# Patient Record
Sex: FEMALE | Race: WHITE | Employment: UNEMPLOYED | URBAN - METROPOLITAN AREA
[De-identification: names, ages, dates, MRNs, and addresses within clinical notes are randomized per-mention and may not be internally consistent; named-entity substitution may affect disease eponyms.]

---

## 2017-12-18 ENCOUNTER — OFFICE VISIT (OUTPATIENT)
Dept: INTERNAL MEDICINE CLINIC | Facility: CLINIC | Age: 22
End: 2017-12-18

## 2017-12-18 VITALS
HEIGHT: 65 IN | SYSTOLIC BLOOD PRESSURE: 100 MMHG | TEMPERATURE: 99 F | WEIGHT: 227.81 LBS | DIASTOLIC BLOOD PRESSURE: 60 MMHG | HEART RATE: 84 BPM | BODY MASS INDEX: 37.95 KG/M2

## 2017-12-18 DIAGNOSIS — Z00.00 LABORATORY EXAMINATION ORDERED AS PART OF A COMPLETE PHYSICAL EXAMINATION: ICD-10-CM

## 2017-12-18 DIAGNOSIS — F84.5 ASPERGER'S DISORDER: ICD-10-CM

## 2017-12-18 DIAGNOSIS — F34.1 DYSTHYMIC DISORDER: ICD-10-CM

## 2017-12-18 DIAGNOSIS — Z00.00 ROUTINE GENERAL MEDICAL EXAMINATION AT A HEALTH CARE FACILITY: Primary | ICD-10-CM

## 2017-12-18 DIAGNOSIS — F41.1 GENERALIZED ANXIETY DISORDER: ICD-10-CM

## 2017-12-18 DIAGNOSIS — Z12.4 SCREENING FOR MALIGNANT NEOPLASM OF CERVIX: ICD-10-CM

## 2017-12-18 PROCEDURE — 88175 CYTOPATH C/V AUTO FLUID REDO: CPT | Performed by: NURSE PRACTITIONER

## 2017-12-18 PROCEDURE — 99385 PREV VISIT NEW AGE 18-39: CPT | Performed by: NURSE PRACTITIONER

## 2017-12-18 NOTE — PROGRESS NOTES
Jose Hines is a 25year old female who presents for a complete physical exam:     ISU. English     Patient complains of:  History of pneumonia. Anxiety/depression with OCD. On Prozac.   She is seeing the [place] psychiatrist.   She is fee pain or ST  LUNGS: denies shortness of breath with exertion  CARDIOVASCULAR: denies chest pain on exertion  GI: denies abdominal pain,denies heartburn  : denies dysuria, vaginal discharge or itching  MUSCULOSKELETAL: denies back pain  NEURO: denies heada Follow-up on file. There are no Patient Instructions on file for this visit.

## 2017-12-20 ENCOUNTER — LAB ENCOUNTER (OUTPATIENT)
Dept: LAB | Age: 22
End: 2017-12-20
Attending: NURSE PRACTITIONER
Payer: COMMERCIAL

## 2017-12-20 DIAGNOSIS — Z00.00 LABORATORY EXAMINATION ORDERED AS PART OF A COMPLETE PHYSICAL EXAMINATION: ICD-10-CM

## 2017-12-20 DIAGNOSIS — F41.1 GENERALIZED ANXIETY DISORDER: ICD-10-CM

## 2017-12-20 PROCEDURE — 80053 COMPREHEN METABOLIC PANEL: CPT

## 2017-12-20 PROCEDURE — 84443 ASSAY THYROID STIM HORMONE: CPT

## 2017-12-20 PROCEDURE — 80061 LIPID PANEL: CPT

## 2017-12-20 PROCEDURE — 82306 VITAMIN D 25 HYDROXY: CPT

## 2017-12-20 PROCEDURE — 85025 COMPLETE CBC W/AUTO DIFF WBC: CPT

## 2018-07-16 NOTE — TELEPHONE ENCOUNTER
Mom states Marybeth Montgomery has been seeing the school psychologist, but is a senior and wanting to establish care with someone local. She is wanting 2 names of someone SD would recommend?   She says Tiffanie Allen knows Marybeth Montgomery, so Mom would like someone that SD would [facility]

## 2018-07-16 NOTE — TELEPHONE ENCOUNTER
I met her only one time. My documentation states seeing psychiatrist at school   Mom's message states psychologist.  Please clarfy what she needs.

## 2018-07-17 NOTE — TELEPHONE ENCOUNTER
Called pt's mother, Renetta Mora back. LMTCB. Per Renetta Adamsy in previous telephone conversation, she verbally allowed me to leave a detailed message with information. Asked for a return call after 8 am tomorrow to further discuss.

## 2018-07-17 NOTE — TELEPHONE ENCOUNTER
I do not know any of the psychiatrists personally   I will enter referral to [address] [place] [address] for assistance. They will call her to discuss her needs. Ok to fill prozac if needed until she can get in to see someone.

## 2018-07-17 NOTE — TELEPHONE ENCOUNTER
Pt's mother Amy Nolan called back ([id number] [address] Alberts per HIPAA). Clarified what was needed for pt. Pt was in the room while mother was on speaker phone.   Amy Nolan stated pt has been seeing a psychiatrist since age 16, and has been seeing someone at her college ([address]

## 2018-07-18 NOTE — TELEPHONE ENCOUNTER
Spoke with Aime Johnson patient's mother-Ok per HIPAA Pt was in the room while mother was on speaker phone-informed SD does not know of any psychiatrists personally, referral entered for [facility] navigator for assistance.  Patient and Mother aware [facility] will call her to d

## 2019-01-22 ENCOUNTER — OFFICE VISIT (OUTPATIENT)
Dept: INTERNAL MEDICINE CLINIC | Facility: CLINIC | Age: 24
End: 2019-01-22
Payer: COMMERCIAL

## 2019-01-22 VITALS
HEART RATE: 60 BPM | WEIGHT: 239.19 LBS | BODY MASS INDEX: 39.85 KG/M2 | TEMPERATURE: 98 F | DIASTOLIC BLOOD PRESSURE: 66 MMHG | RESPIRATION RATE: 16 BRPM | HEIGHT: 65 IN | SYSTOLIC BLOOD PRESSURE: 106 MMHG

## 2019-01-22 DIAGNOSIS — F84.5 ASPERGER'S DISORDER: ICD-10-CM

## 2019-01-22 DIAGNOSIS — Z00.00 ROUTINE GENERAL MEDICAL EXAMINATION AT A HEALTH CARE FACILITY: Primary | ICD-10-CM

## 2019-01-22 DIAGNOSIS — F34.1 DYSTHYMIC DISORDER: ICD-10-CM

## 2019-01-22 DIAGNOSIS — F41.1 GENERALIZED ANXIETY DISORDER: ICD-10-CM

## 2019-01-22 PROCEDURE — 90686 IIV4 VACC NO PRSV 0.5 ML IM: CPT | Performed by: NURSE PRACTITIONER

## 2019-01-22 PROCEDURE — 90471 IMMUNIZATION ADMIN: CPT | Performed by: NURSE PRACTITIONER

## 2019-01-22 PROCEDURE — 99395 PREV VISIT EST AGE 18-39: CPT | Performed by: NURSE PRACTITIONER

## 2019-01-22 RX ORDER — CLOMIPRAMINE HYDROCHLORIDE 25 MG/1
CAPSULE ORAL
COMMUNITY
End: 2020-01-27

## 2019-01-22 RX ORDER — MULTIVIT-MIN/IRON/FOLIC ACID/K 18-600-40
CAPSULE ORAL
COMMUNITY

## 2019-01-22 NOTE — PROGRESS NOTES
Olivier Lopes is a 26year old female who presents for a complete physical exam:       Patient complains of:     Anxiety/dysthymic disorder. Follows with psych. Clomipramine 25mg   [place]. Wants to change her meds.    Not feelin dysplasia:  Yes  Menstrual Cycle: regular         LMP: 12/25  Symptoms:   Sexually Active:  No   Breast Cancer Screening: There are no preventive care reminders to display for this patient.        REVIEW OF SYSTEMS:   GENERAL: feels well otherwise  SKIN: d Refills for this Visit:  Requested Prescriptions      No prescriptions requested or ordered in this encounter       Imaging & Consults:  FLULAVAL INFLUENZA VACCINE QUAD PRESERVATIVE FREE 0.5 ML    No Follow-up on file.   There are no Patient Instructions on

## 2019-03-28 ENCOUNTER — HOSPITAL ENCOUNTER (EMERGENCY)
Facility: HOSPITAL | Age: 24
Discharge: HOME | End: 2019-03-28
Attending: EMERGENCY MEDICINE
Payer: COMMERCIAL

## 2019-03-28 VITALS
HEIGHT: 64 IN | WEIGHT: 220 LBS | BODY MASS INDEX: 37.56 KG/M2 | DIASTOLIC BLOOD PRESSURE: 69 MMHG | TEMPERATURE: 98 F | HEART RATE: 76 BPM | OXYGEN SATURATION: 98 % | RESPIRATION RATE: 16 BRPM | SYSTOLIC BLOOD PRESSURE: 111 MMHG

## 2019-03-28 DIAGNOSIS — T43.591A: Primary | ICD-10-CM

## 2019-03-28 PROCEDURE — 80048 BASIC METABOLIC PNL TOTAL CA: CPT | Performed by: EMERGENCY MEDICINE

## 2019-03-28 PROCEDURE — 81001 URINALYSIS AUTO W/SCOPE: CPT | Performed by: EMERGENCY MEDICINE

## 2019-03-28 PROCEDURE — 87086 URINE CULTURE/COLONY COUNT: CPT | Performed by: EMERGENCY MEDICINE

## 2019-03-28 PROCEDURE — 99284 EMERGENCY DEPT VISIT MOD MDM: CPT

## 2019-03-28 PROCEDURE — 80307 DRUG TEST PRSMV CHEM ANLYZR: CPT | Performed by: EMERGENCY MEDICINE

## 2019-03-28 PROCEDURE — 81025 URINE PREGNANCY TEST: CPT | Performed by: EMERGENCY MEDICINE

## 2019-03-28 PROCEDURE — 80329 ANALGESICS NON-OPIOID 1 OR 2: CPT | Performed by: EMERGENCY MEDICINE

## 2019-03-28 PROCEDURE — 93010 ELECTROCARDIOGRAM REPORT: CPT

## 2019-03-28 PROCEDURE — 80320 DRUG SCREEN QUANTALCOHOLS: CPT | Performed by: EMERGENCY MEDICINE

## 2019-03-28 PROCEDURE — 36415 COLL VENOUS BLD VENIPUNCTURE: CPT

## 2019-03-28 NOTE — ED INITIAL ASSESSMENT (HPI)
Pt sent from [facility] for medical clearance. Pt states she took 6-7 atarax over the course of a 10 hour period yesterday. Pt states she did not intend to harm herself, she just \"wanted to sleep because people kept bothering me\".  AYLIN stated pt was offered outp

## 2019-12-17 ENCOUNTER — TELEPHONE (OUTPATIENT)
Dept: INTERNAL MEDICINE CLINIC | Facility: CLINIC | Age: 24
End: 2019-12-17

## 2019-12-17 DIAGNOSIS — Z13.220 SCREENING FOR LIPID DISORDERS: ICD-10-CM

## 2019-12-17 DIAGNOSIS — Z00.00 ROUTINE GENERAL MEDICAL EXAMINATION AT A HEALTH CARE FACILITY: Primary | ICD-10-CM

## 2019-12-17 DIAGNOSIS — Z13.228 SCREENING FOR METABOLIC DISORDER: ICD-10-CM

## 2019-12-17 DIAGNOSIS — Z13.0 SCREENING FOR BLOOD DISEASE: ICD-10-CM

## 2019-12-17 DIAGNOSIS — Z13.29 SCREENING FOR THYROID DISORDER: ICD-10-CM

## 2019-12-17 NOTE — TELEPHONE ENCOUNTER
Future Appointments   Date Time Provider Lukasz Pedersen   1/27/2020 11:00 AM CRIS Coughlin [facility]     Pt would like fasting BW orders sent to [facility].

## 2020-01-25 ENCOUNTER — LAB ENCOUNTER (OUTPATIENT)
Dept: LAB | Age: 25
End: 2020-01-25
Attending: NURSE PRACTITIONER
Payer: COMMERCIAL

## 2020-01-25 DIAGNOSIS — Z13.29 SCREENING FOR THYROID DISORDER: ICD-10-CM

## 2020-01-25 DIAGNOSIS — Z00.00 ROUTINE GENERAL MEDICAL EXAMINATION AT A HEALTH CARE FACILITY: ICD-10-CM

## 2020-01-25 DIAGNOSIS — Z13.220 SCREENING FOR LIPID DISORDERS: ICD-10-CM

## 2020-01-25 DIAGNOSIS — Z13.0 SCREENING FOR BLOOD DISEASE: ICD-10-CM

## 2020-01-25 DIAGNOSIS — Z13.228 SCREENING FOR METABOLIC DISORDER: ICD-10-CM

## 2020-01-25 LAB
ALBUMIN SERPL-MCNC: 3.5 G/DL (ref 3.4–5)
ALBUMIN/GLOB SERPL: 1 {RATIO} (ref 1–2)
ALP LIVER SERPL-CCNC: 84 U/L (ref 52–144)
ALT SERPL-CCNC: 29 U/L (ref 13–56)
ANION GAP SERPL CALC-SCNC: 4 MMOL/L (ref 0–18)
AST SERPL-CCNC: 18 U/L (ref 15–37)
BASOPHILS # BLD AUTO: 0.04 X10(3) UL (ref 0–0.2)
BASOPHILS NFR BLD AUTO: 0.6 %
BILIRUB SERPL-MCNC: 0.5 MG/DL (ref 0.1–2)
BUN BLD-MCNC: 7 MG/DL (ref 7–18)
BUN/CREAT SERPL: 8.2 (ref 10–20)
CALCIUM BLD-MCNC: 8.6 MG/DL (ref 8.5–10.1)
CHLORIDE SERPL-SCNC: 109 MMOL/L (ref 98–112)
CHOLEST SMN-MCNC: 132 MG/DL (ref ?–200)
CO2 SERPL-SCNC: 26 MMOL/L (ref 21–32)
CREAT BLD-MCNC: 0.85 MG/DL (ref 0.55–1.02)
DEPRECATED RDW RBC AUTO: 40.6 FL (ref 35.1–46.3)
EOSINOPHIL # BLD AUTO: 0.14 X10(3) UL (ref 0–0.7)
EOSINOPHIL NFR BLD AUTO: 2 %
ERYTHROCYTE [DISTWIDTH] IN BLOOD BY AUTOMATED COUNT: 12.2 % (ref 11–15)
GLOBULIN PLAS-MCNC: 3.6 G/DL (ref 2.8–4.4)
GLUCOSE BLD-MCNC: 88 MG/DL (ref 70–99)
HCT VFR BLD AUTO: 38.9 % (ref 35–48)
HDLC SERPL-MCNC: 33 MG/DL (ref 40–59)
HGB BLD-MCNC: 12.9 G/DL (ref 12–16)
IMM GRANULOCYTES # BLD AUTO: 0.02 X10(3) UL (ref 0–1)
IMM GRANULOCYTES NFR BLD: 0.3 %
LDLC SERPL CALC-MCNC: 80 MG/DL (ref ?–100)
LYMPHOCYTES # BLD AUTO: 2.91 X10(3) UL (ref 1–4)
LYMPHOCYTES NFR BLD AUTO: 41.3 %
M PROTEIN MFR SERPL ELPH: 7.1 G/DL (ref 6.4–8.2)
MCH RBC QN AUTO: 30.3 PG (ref 26–34)
MCHC RBC AUTO-ENTMCNC: 33.2 G/DL (ref 31–37)
MCV RBC AUTO: 91.3 FL (ref 80–100)
MONOCYTES # BLD AUTO: 0.5 X10(3) UL (ref 0.1–1)
MONOCYTES NFR BLD AUTO: 7.1 %
NEUTROPHILS # BLD AUTO: 3.43 X10 (3) UL (ref 1.5–7.7)
NEUTROPHILS # BLD AUTO: 3.43 X10(3) UL (ref 1.5–7.7)
NEUTROPHILS NFR BLD AUTO: 48.7 %
NONHDLC SERPL-MCNC: 99 MG/DL (ref ?–130)
OSMOLALITY SERPL CALC.SUM OF ELEC: 285 MOSM/KG (ref 275–295)
PATIENT FASTING Y/N/NP: YES
PATIENT FASTING Y/N/NP: YES
PLATELET # BLD AUTO: 342 10(3)UL (ref 150–450)
POTASSIUM SERPL-SCNC: 3.7 MMOL/L (ref 3.5–5.1)
RBC # BLD AUTO: 4.26 X10(6)UL (ref 3.8–5.3)
SODIUM SERPL-SCNC: 139 MMOL/L (ref 136–145)
TRIGL SERPL-MCNC: 96 MG/DL (ref 30–149)
TSI SER-ACNC: 1.39 MIU/ML (ref 0.36–3.74)
VLDLC SERPL CALC-MCNC: 19 MG/DL (ref 0–30)
WBC # BLD AUTO: 7 X10(3) UL (ref 4–11)

## 2020-01-25 PROCEDURE — 80061 LIPID PANEL: CPT | Performed by: NURSE PRACTITIONER

## 2020-01-25 PROCEDURE — 80050 GENERAL HEALTH PANEL: CPT | Performed by: NURSE PRACTITIONER

## 2020-01-27 ENCOUNTER — OFFICE VISIT (OUTPATIENT)
Dept: INTERNAL MEDICINE CLINIC | Facility: CLINIC | Age: 25
End: 2020-01-27
Payer: COMMERCIAL

## 2020-01-27 VITALS
OXYGEN SATURATION: 97 % | WEIGHT: 243 LBS | HEIGHT: 65.35 IN | BODY MASS INDEX: 40 KG/M2 | TEMPERATURE: 98 F | HEART RATE: 76 BPM | DIASTOLIC BLOOD PRESSURE: 66 MMHG | SYSTOLIC BLOOD PRESSURE: 110 MMHG

## 2020-01-27 DIAGNOSIS — Z00.00 ROUTINE GENERAL MEDICAL EXAMINATION AT A HEALTH CARE FACILITY: Primary | ICD-10-CM

## 2020-01-27 DIAGNOSIS — F41.9 ANXIETY AND DEPRESSION: ICD-10-CM

## 2020-01-27 DIAGNOSIS — F41.1 GENERALIZED ANXIETY DISORDER: ICD-10-CM

## 2020-01-27 DIAGNOSIS — F34.1 DYSTHYMIC DISORDER: ICD-10-CM

## 2020-01-27 DIAGNOSIS — F32.A ANXIETY AND DEPRESSION: ICD-10-CM

## 2020-01-27 DIAGNOSIS — F84.5 ASPERGER'S DISORDER: ICD-10-CM

## 2020-01-27 PROCEDURE — 99395 PREV VISIT EST AGE 18-39: CPT | Performed by: NURSE PRACTITIONER

## 2020-01-27 RX ORDER — DESVENLAFAXINE 50 MG/1
1 TABLET, EXTENDED RELEASE ORAL DAILY
COMMUNITY
End: 2020-07-30 | Stop reason: ALTCHOICE

## 2020-01-27 NOTE — PROGRESS NOTES
Stanford Oropeza is a 22year old female who presents for a complete physical exam:       Patient complains of:    Depression. [address] She is between psychiatrists and is frustrated veronique her current psych moved.    Pristiq    [employer] preventive care reminders to display for this patient.    Gyne:   Pap Smear,3 Years due on 12/18/2020           History of dysplasia:  No  Menstrual Cycle: regular          LMP: 1/20/2020  Symptoms: periods are regular  Sexually Active:  No   Breast Cancer like to discuss further if she has not been fully vaccinated. Routine general medical examination at a health care facility  (primary encounter diagnosis)  Anxiety and depression   Pristiq   Per psych. She has upcoming appt at [facility].   She will sandee

## 2020-02-20 ENCOUNTER — OFFICE VISIT (OUTPATIENT)
Dept: FAMILY MEDICINE CLINIC | Facility: CLINIC | Age: 25
End: 2020-02-20
Payer: COMMERCIAL

## 2020-02-20 VITALS
DIASTOLIC BLOOD PRESSURE: 60 MMHG | TEMPERATURE: 100 F | WEIGHT: 247.63 LBS | HEIGHT: 65 IN | RESPIRATION RATE: 16 BRPM | HEART RATE: 84 BPM | SYSTOLIC BLOOD PRESSURE: 100 MMHG | BODY MASS INDEX: 41.26 KG/M2 | OXYGEN SATURATION: 99 %

## 2020-02-20 DIAGNOSIS — R68.89 FLU-LIKE SYMPTOMS: ICD-10-CM

## 2020-02-20 DIAGNOSIS — J02.9 SORE THROAT: Primary | ICD-10-CM

## 2020-02-20 LAB
CONTROL LINE PRESENT WITH A CLEAR BACKGROUND (YES/NO): YES YES/NO
KIT LOT #: NORMAL NUMERIC
POCT INFLUENZA A: NEGATIVE
POCT INFLUENZA B: NEGATIVE

## 2020-02-20 PROCEDURE — 99213 OFFICE O/P EST LOW 20 MIN: CPT | Performed by: NURSE PRACTITIONER

## 2020-02-20 PROCEDURE — 87880 STREP A ASSAY W/OPTIC: CPT | Performed by: NURSE PRACTITIONER

## 2020-02-20 PROCEDURE — 87502 INFLUENZA DNA AMP PROBE: CPT | Performed by: NURSE PRACTITIONER

## 2020-02-20 NOTE — PROGRESS NOTES
Patient presents with:  Sore Throat: sore throat, nasal congestion, bl ear/pressure, cough, pnd, x last night   :    HPI:   Nestor Reynaga is a 22year old female who presents for upper respiratory symptoms for  2  days. Started suddenly.   Symptoms hav NOSE: nostrils patent, clear nasal mucous, nasal mucosa reddened and swollen  THROAT: oral mucosa pink, moist. No visible dental caries. Posterior pharynx is mild erythematous. no exudates.   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 The flu is caused by a virus.  The virus spreads through the air in droplets when · Call your provider if your fever is 100.4°F (38°C) or higher, or you become dizzy, lightheaded, or short of breath. Taking steps to protect others  · Wash your hands often, especially after coughing or sneezing.  Or clean your hands with an alcohol-based Handwashing is one of the best ways to prevent many common infections. If you are caring for or visiting someone with the flu, wash your hands each time you enter and leave the room. Follow these steps:  · Use warm water and plenty of soap.  Rub your hands

## 2020-02-23 ENCOUNTER — OFFICE VISIT (OUTPATIENT)
Dept: FAMILY MEDICINE CLINIC | Facility: CLINIC | Age: 25
End: 2020-02-23
Payer: COMMERCIAL

## 2020-02-23 VITALS
SYSTOLIC BLOOD PRESSURE: 102 MMHG | DIASTOLIC BLOOD PRESSURE: 78 MMHG | OXYGEN SATURATION: 99 % | RESPIRATION RATE: 20 BRPM | HEART RATE: 109 BPM | TEMPERATURE: 99 F

## 2020-02-23 DIAGNOSIS — J06.9 VIRAL UPPER RESPIRATORY ILLNESS: Primary | ICD-10-CM

## 2020-02-23 PROCEDURE — 99213 OFFICE O/P EST LOW 20 MIN: CPT | Performed by: NURSE PRACTITIONER

## 2020-02-23 NOTE — PROGRESS NOTES
Patient presents with:  Flu  :    HPI:   Gerald Ovalles is a 22year old female who presents for upper respiratory symptoms for  4  days. Started suddenly. Symptoms have been better since onset, still with cough.  This provider saw this patient 3 days conjunctiva clear, EOM intact  EARS: TM's clear gray, no bulging, no retraction, + fluid, bony landmarks intact  NOSE: nostrils patent, clear nasal mucous, nasal mucosa reddened and swollen  THROAT: oral mucosa pink, moist. No visible dental caries.  Poster

## 2020-02-27 PROBLEM — F42.9 OBSESSIVE COMPULSIVE DISORDER: Status: ACTIVE | Noted: 2020-02-27

## 2020-02-27 PROBLEM — F33.2 SEVERE RECURRENT MAJOR DEPRESSION WITHOUT PSYCHOTIC FEATURES (HCC): Status: ACTIVE | Noted: 2020-02-27

## 2020-02-27 RX ORDER — DESVENLAFAXINE 25 MG/1
TABLET, EXTENDED RELEASE ORAL
COMMUNITY
Start: 2020-02-25 | End: 2020-07-30 | Stop reason: ALTCHOICE

## 2020-03-19 ENCOUNTER — E-VISIT (OUTPATIENT)
Dept: FAMILY MEDICINE CLINIC | Facility: CLINIC | Age: 25
End: 2020-03-19

## 2020-03-19 DIAGNOSIS — J30.2 SEASONAL ALLERGIES: Primary | ICD-10-CM

## 2020-07-27 ENCOUNTER — E-VISIT (OUTPATIENT)
Dept: FAMILY MEDICINE CLINIC | Facility: CLINIC | Age: 25
End: 2020-07-27

## 2020-07-27 DIAGNOSIS — Z02.9 ADMINISTRATIVE ENCOUNTER: Primary | ICD-10-CM

## 2020-07-28 NOTE — PROGRESS NOTES
Patient was directed to contact her PCP regarding ongoing health concerns. E-visit cancelled with No Charge.

## 2020-07-30 ENCOUNTER — OFFICE VISIT (OUTPATIENT)
Dept: INTERNAL MEDICINE CLINIC | Facility: CLINIC | Age: 25
End: 2020-07-30
Payer: COMMERCIAL

## 2020-07-30 ENCOUNTER — LAB ENCOUNTER (OUTPATIENT)
Dept: LAB | Facility: HOSPITAL | Age: 25
End: 2020-07-30
Attending: NURSE PRACTITIONER
Payer: COMMERCIAL

## 2020-07-30 VITALS
BODY MASS INDEX: 43 KG/M2 | TEMPERATURE: 98 F | DIASTOLIC BLOOD PRESSURE: 64 MMHG | SYSTOLIC BLOOD PRESSURE: 94 MMHG | WEIGHT: 259 LBS | HEART RATE: 96 BPM | RESPIRATION RATE: 16 BRPM

## 2020-07-30 DIAGNOSIS — R53.83 FATIGUE, UNSPECIFIED TYPE: ICD-10-CM

## 2020-07-30 DIAGNOSIS — F32.A ANXIETY AND DEPRESSION: ICD-10-CM

## 2020-07-30 DIAGNOSIS — J02.9 SORE THROAT: ICD-10-CM

## 2020-07-30 DIAGNOSIS — R51.9 PERSISTENT HEADACHES: ICD-10-CM

## 2020-07-30 DIAGNOSIS — F41.9 ANXIETY AND DEPRESSION: ICD-10-CM

## 2020-07-30 DIAGNOSIS — J02.9 SORE THROAT: Primary | ICD-10-CM

## 2020-07-30 PROCEDURE — 99214 OFFICE O/P EST MOD 30 MIN: CPT | Performed by: NURSE PRACTITIONER

## 2020-07-30 PROCEDURE — 3074F SYST BP LT 130 MM HG: CPT | Performed by: NURSE PRACTITIONER

## 2020-07-30 PROCEDURE — 3078F DIAST BP <80 MM HG: CPT | Performed by: NURSE PRACTITIONER

## 2020-07-30 RX ORDER — DESVENLAFAXINE 100 MG/1
100 TABLET, EXTENDED RELEASE ORAL DAILY
COMMUNITY
Start: 2020-07-21

## 2020-07-30 RX ORDER — FLUTICASONE PROPIONATE 50 MCG
1 SPRAY, SUSPENSION (ML) NASAL 2 TIMES DAILY
Qty: 1 BOTTLE | Refills: 3 | Status: SHIPPED | OUTPATIENT
Start: 2020-07-30 | End: 2020-11-23

## 2020-07-30 RX ORDER — AZITHROMYCIN 250 MG/1
TABLET, FILM COATED ORAL
Qty: 6 TABLET | Refills: 0 | Status: SHIPPED | OUTPATIENT
Start: 2020-07-30 | End: 2020-08-04

## 2020-07-30 NOTE — PROGRESS NOTES
Yayo Garcia is a 22year old female.   Patient presents with:  Migraine: SN Rm 11; H/O migraines, more consistant, \"feels sluggish\"  Sore Throat: x 1 wk, h/o allergies, some nasal drip      HPI:   Presents for eval of sore throat and eval of migrai Smoking status: Never Smoker      Smokeless tobacco: Never Used    Alcohol use: No    Drug use: No    No family history on file.      Allergies    Latex                   ITCHING  Pollen                  ITCHING    REVIEW OF SYSTEMS:   GENERAL HEALTH: not file for this visit.

## 2020-08-01 LAB — SARS-COV-2 BY PCR: NOT DETECTED

## 2020-08-03 ENCOUNTER — TELEPHONE (OUTPATIENT)
Dept: INTERNAL MEDICINE CLINIC | Facility: CLINIC | Age: 25
End: 2020-08-03

## 2020-08-03 NOTE — TELEPHONE ENCOUNTER
Based on Pt's negative results of her COVID test, Pt needs a \"return to [employer]" note    The note needs to say that she missed work for not feeling good for 2 days     Pt is fever free for 2 days now.,

## 2020-08-03 NOTE — TELEPHONE ENCOUNTER
LOV with SD 7/30/2020; SD please advise regarding work note for patient due to missed days. Patients covid testing was negative.

## 2020-09-30 ENCOUNTER — TELEMEDICINE (OUTPATIENT)
Dept: INTERNAL MEDICINE CLINIC | Facility: CLINIC | Age: 25
End: 2020-09-30

## 2020-09-30 ENCOUNTER — APPOINTMENT (OUTPATIENT)
Dept: LAB | Facility: HOSPITAL | Age: 25
End: 2020-09-30
Attending: NURSE PRACTITIONER
Payer: COMMERCIAL

## 2020-09-30 DIAGNOSIS — R52 BODY ACHES: ICD-10-CM

## 2020-09-30 DIAGNOSIS — R50.9 FEVER, UNSPECIFIED FEVER CAUSE: Primary | ICD-10-CM

## 2020-09-30 DIAGNOSIS — J02.9 SORE THROAT: ICD-10-CM

## 2020-09-30 DIAGNOSIS — R50.9 FEVER, UNSPECIFIED FEVER CAUSE: ICD-10-CM

## 2020-09-30 PROCEDURE — 99213 OFFICE O/P EST LOW 20 MIN: CPT | Performed by: NURSE PRACTITIONER

## 2020-09-30 NOTE — PROGRESS NOTES
Due to COVID-19 ACTION PLAN, the patient's office visit was converted to a video visit. This visit is conducted using video and audio. The patient consents to this service.   The patient understands and accepts financial responsibility for any deductible, exam as this is a video visit. Appropriate affect, alert and oriented x 3. No distress. No conversational dyspnea. Speaking in full sentences.         ASSESSMENT AND PLAN:     Fever, unspecified fever cause  (primary encounter diagnosis)  Sore throat  C

## 2020-10-06 ENCOUNTER — PATIENT MESSAGE (OUTPATIENT)
Dept: INTERNAL MEDICINE CLINIC | Facility: CLINIC | Age: 25
End: 2020-10-06

## 2020-10-07 NOTE — TELEPHONE ENCOUNTER
From: Alis Dyson  To:  CRIS Evans  Sent: 10/6/2020 6:59 PM CDT  Subject: Other    I wasn't sure where to send this, but may I please a doctor note for to 9/28-10/07 as the first few days I could not go in due to a fever and after an evisit t

## 2020-10-07 NOTE — TELEPHONE ENCOUNTER
Called and LVM for patient to inform her letter is done and ready to . Since patient did not answer I will mail out a copy and hold one at my desk in yellow folder if she prefers to .

## 2020-10-07 NOTE — TELEPHONE ENCOUNTER
Pt stated the letter needs to be retyped. She was out of work from 9/28/20 to 10/2/20 and returning to work on 10/5/20. No need to print no need to fax or mail she will print it herself via [employer].

## 2020-11-19 ENCOUNTER — TELEPHONE (OUTPATIENT)
Dept: INTERNAL MEDICINE CLINIC | Facility: CLINIC | Age: 25
End: 2020-11-19

## 2020-11-19 DIAGNOSIS — Z00.00 ROUTINE GENERAL MEDICAL EXAMINATION AT A HEALTH CARE FACILITY: Primary | ICD-10-CM

## 2020-11-19 DIAGNOSIS — Z13.220 SCREENING FOR LIPID DISORDERS: ICD-10-CM

## 2020-11-19 DIAGNOSIS — Z13.228 SCREENING FOR METABOLIC DISORDER: ICD-10-CM

## 2020-11-19 DIAGNOSIS — Z13.0 SCREENING FOR BLOOD DISEASE: ICD-10-CM

## 2020-11-19 DIAGNOSIS — Z13.29 SCREENING FOR THYROID DISORDER: ICD-10-CM

## 2020-11-19 NOTE — TELEPHONE ENCOUNTER
Future Appointments   Date Time Provider Lukasz Nuvia   12/2/2020  3:40 PM Jocelyn Weller, APRN [facility]     Orders to edward- Pt informed that labs need to be completed no sooner than 2 weeks prior to the appt.  Pt aware to fast-no call frankie

## 2020-11-23 ENCOUNTER — OFFICE VISIT (OUTPATIENT)
Dept: INTERNAL MEDICINE CLINIC | Facility: CLINIC | Age: 25
End: 2020-11-23
Payer: COMMERCIAL

## 2020-11-23 VITALS
BODY MASS INDEX: 44.2 KG/M2 | DIASTOLIC BLOOD PRESSURE: 66 MMHG | WEIGHT: 275 LBS | HEART RATE: 84 BPM | TEMPERATURE: 97 F | HEIGHT: 66 IN | SYSTOLIC BLOOD PRESSURE: 116 MMHG

## 2020-11-23 DIAGNOSIS — F33.2 SEVERE RECURRENT MAJOR DEPRESSION WITHOUT PSYCHOTIC FEATURES (HCC): ICD-10-CM

## 2020-11-23 DIAGNOSIS — Z00.00 ROUTINE GENERAL MEDICAL EXAMINATION AT A HEALTH CARE FACILITY: Primary | ICD-10-CM

## 2020-11-23 DIAGNOSIS — Z11.3 SCREEN FOR STD (SEXUALLY TRANSMITTED DISEASE): ICD-10-CM

## 2020-11-23 DIAGNOSIS — F41.9 ANXIETY AND DEPRESSION: ICD-10-CM

## 2020-11-23 DIAGNOSIS — Z12.4 SCREENING FOR CERVICAL CANCER: ICD-10-CM

## 2020-11-23 DIAGNOSIS — F32.A ANXIETY AND DEPRESSION: ICD-10-CM

## 2020-11-23 PROCEDURE — 87491 CHLMYD TRACH DNA AMP PROBE: CPT | Performed by: NURSE PRACTITIONER

## 2020-11-23 PROCEDURE — 3074F SYST BP LT 130 MM HG: CPT | Performed by: NURSE PRACTITIONER

## 2020-11-23 PROCEDURE — 90651 9VHPV VACCINE 2/3 DOSE IM: CPT | Performed by: NURSE PRACTITIONER

## 2020-11-23 PROCEDURE — 90471 IMMUNIZATION ADMIN: CPT | Performed by: NURSE PRACTITIONER

## 2020-11-23 PROCEDURE — 87591 N.GONORRHOEAE DNA AMP PROB: CPT | Performed by: NURSE PRACTITIONER

## 2020-11-23 PROCEDURE — 3008F BODY MASS INDEX DOCD: CPT | Performed by: NURSE PRACTITIONER

## 2020-11-23 PROCEDURE — 3078F DIAST BP <80 MM HG: CPT | Performed by: NURSE PRACTITIONER

## 2020-11-23 PROCEDURE — 88175 CYTOPATH C/V AUTO FLUID REDO: CPT | Performed by: NURSE PRACTITIONER

## 2020-11-23 PROCEDURE — 99072 ADDL SUPL MATRL&STAF TM PHE: CPT | Performed by: NURSE PRACTITIONER

## 2020-11-23 PROCEDURE — 99395 PREV VISIT EST AGE 18-39: CPT | Performed by: NURSE PRACTITIONER

## 2020-11-23 NOTE — PROGRESS NOTES
Daniel Marrero is a 22year old female who presents for a complete physical exam:       Patient complains of:    anxeity and depression   Trying to adjust her meds with psych for better control of panic attacks.     New job       Osei Dows from Last 6 10/08/2019  09/27/2020    Dental Visits: yes   Colon cancer screening: na There are no preventive care reminders to display for this patient.    Gyne:   Pap Smear,3 Years due on 12/18/2020         History of dysplasia:  No  Menstrual Cycle: regular MAINTENANCE:  Labs done early 2020. Stable   Will continue to follow.  gardasil #1 today.       Routine general medical examination at a health care facility  (primary encounter diagnosis)  Severe recurrent major depression without psychotic features (hcc)

## 2020-11-27 ENCOUNTER — OFFICE VISIT (OUTPATIENT)
Dept: FAMILY MEDICINE CLINIC | Facility: CLINIC | Age: 25
End: 2020-11-27
Payer: COMMERCIAL

## 2020-11-27 VITALS
HEART RATE: 97 BPM | BODY MASS INDEX: 44.98 KG/M2 | DIASTOLIC BLOOD PRESSURE: 78 MMHG | WEIGHT: 270 LBS | OXYGEN SATURATION: 98 % | TEMPERATURE: 99 F | RESPIRATION RATE: 20 BRPM | SYSTOLIC BLOOD PRESSURE: 112 MMHG | HEIGHT: 65 IN

## 2020-11-27 DIAGNOSIS — Z20.822 EXPOSURE TO COVID-19 VIRUS: ICD-10-CM

## 2020-11-27 DIAGNOSIS — J02.9 SORE THROAT: Primary | ICD-10-CM

## 2020-11-27 PROCEDURE — 99072 ADDL SUPL MATRL&STAF TM PHE: CPT | Performed by: NURSE PRACTITIONER

## 2020-11-27 PROCEDURE — 3078F DIAST BP <80 MM HG: CPT | Performed by: NURSE PRACTITIONER

## 2020-11-27 PROCEDURE — 3074F SYST BP LT 130 MM HG: CPT | Performed by: NURSE PRACTITIONER

## 2020-11-27 PROCEDURE — 99213 OFFICE O/P EST LOW 20 MIN: CPT | Performed by: NURSE PRACTITIONER

## 2020-11-27 PROCEDURE — 3008F BODY MASS INDEX DOCD: CPT | Performed by: NURSE PRACTITIONER

## 2020-11-27 NOTE — PROGRESS NOTES
CHIEF COMPLAINT:   Patient presents with:  Testing: covid exposure 3days, sore throat      HPI:   Coleen Dinh is a 22year old female who presents for Covid 19 exposure 3 days ago. Reports sore throat but thinks it could be allergies.  Requesting co NOSE: Nostrils patent, no nasal discharge, nasal mucosa pink and non-inflamed  THROAT: Oral mucosa pink, moist. Posterior pharynx is not erythematous. No exudates. Tonsils 2/4.     NECK: Supple, non-tender  LUNGS: clear to auscultation bilaterally; good air · Stay home. Call your healthcare provider and tell them you have symptoms of COVID-19. Do this before going to any hospital or clinic. Follow your provider's instructions. You may be advised to isolate yourself at home. This is called self-isolation.  You · Stay home and start self-isolation. Don’t leave your home unless you need to get medical care. Don't go to work, school, or public areas. Don't use public transportation or taxis. · Follow all instructions from your healthcare provider.  Call your health There is currently no vaccine or medicine approved to prevent the virus. The FDA has approved an antiviral medicine called remdesivir for people in the hospital. It is for people 12 years and older who weigh more than about 88 pounds (40 kgs).  Remdesivir i If you've had confirmed COVID-19, your healthcare team may ask you to consider donating your plasma. This is called COVID-19 convalescent plasma donation.  Plasma from people fully recovered from COVID-19 may contain antibodies to help fight COVID-19 in peo Your limits are different if you've had COVID-19 in the last 3 months but are fully recovered without symptoms and you have been exposed to someone with COVID-19. If you are symptom-free, you don't need to stay home away from others or be retested.  The CDC When you return to public settings  When you are well enough to go outside your home, consider the CDC's guidance on cloth face masks:     · The CDC advises all people over age 2 to wear cloth face masks in public settings when around people outside of the © 7260-3621 [employer] [address]. All rights reserved. This information is not intended as a substitute for professional medical care. Always follow your healthcare professional's instructions.             The patient indicates understanding of these i

## 2020-11-27 NOTE — PATIENT INSTRUCTIONS
Coronavirus Disease 2019 (COVID-19): Caring for Yourself or Others   If you or a household member have symptoms of COVID-19, follow these guidelines for preventing spread of the virus, and managing symptoms.    If you think you have COVID-19 symptoms  · S · Tell the healthcare staff about recent travel. This includes local travel on public transport. Staff may need to find other people you have been in contact with. · Follow all instructions the healthcare staff give you.     If you have been diagnosed with There is currently no vaccine or medicine approved to prevent the virus. The FDA has approved an antiviral medicine called remdesivir for people in the hospital. It is for people 12 years and older who weigh more than about 88 pounds (40 kgs).  Remdesivir i If you've had confirmed COVID-19, your healthcare team may ask you to consider donating your plasma. This is called COVID-19 convalescent plasma donation.  Plasma from people fully recovered from COVID-19 may contain antibodies to help fight COVID-19 in peo Your limits are different if you've had COVID-19 in the last 3 months but are fully recovered without symptoms and you have been exposed to someone with COVID-19. If you are symptom-free, you don't need to stay home away from others or be retested.  The CDC When you return to public settings  When you are well enough to go outside your home, consider the CDC's guidance on cloth face masks:     · The CDC advises all people over age 2 to wear cloth face masks in public settings when around people outside of the © 3022-4749 [employer] [address]. All rights reserved. This information is not intended as a substitute for professional medical care. Always follow your healthcare professional's instructions.

## 2021-02-01 ENCOUNTER — OFFICE VISIT (OUTPATIENT)
Dept: INTERNAL MEDICINE CLINIC | Facility: CLINIC | Age: 26
End: 2021-02-01
Payer: COMMERCIAL

## 2021-02-01 VITALS
SYSTOLIC BLOOD PRESSURE: 106 MMHG | TEMPERATURE: 99 F | DIASTOLIC BLOOD PRESSURE: 72 MMHG | HEIGHT: 65 IN | BODY MASS INDEX: 46.32 KG/M2 | WEIGHT: 278 LBS | HEART RATE: 76 BPM

## 2021-02-01 DIAGNOSIS — N90.7 LABIAL CYST: Primary | ICD-10-CM

## 2021-02-01 PROCEDURE — 99213 OFFICE O/P EST LOW 20 MIN: CPT | Performed by: NURSE PRACTITIONER

## 2021-02-01 PROCEDURE — 3008F BODY MASS INDEX DOCD: CPT | Performed by: NURSE PRACTITIONER

## 2021-02-01 PROCEDURE — 3078F DIAST BP <80 MM HG: CPT | Performed by: NURSE PRACTITIONER

## 2021-02-01 PROCEDURE — 3074F SYST BP LT 130 MM HG: CPT | Performed by: NURSE PRACTITIONER

## 2021-02-01 RX ORDER — BUSPIRONE HYDROCHLORIDE 5 MG/1
5 TABLET ORAL 2 TIMES DAILY
COMMUNITY
Start: 2021-01-11

## 2021-02-01 NOTE — PROGRESS NOTES
Valentina Snell is a 26year old female. Patient presents with:  Cyst: AJ rm 10 Cyst in vaginal area      HPI:   Presents for eval of cysts on labia. She feels one is a bit bothersome and the other is not. Noted a few days ago. No drainage.   She de Temp 98.8 °F (37.1 °C) (Temporal)   Ht 5' 5\" (1.651 m)   Wt 278 lb (126.1 kg)   LMP 11/06/2020   BMI 46.26 kg/m²   GENERAL: well developed, well nourished,in no apparent distress      Labia examined. Small lesion noted.   No redness or swelling    GI:

## 2021-12-29 ENCOUNTER — TELEPHONE (OUTPATIENT)
Dept: INTERNAL MEDICINE CLINIC | Facility: CLINIC | Age: 26
End: 2021-12-29

## 2021-12-29 DIAGNOSIS — Z00.00 ROUTINE GENERAL MEDICAL EXAMINATION AT A HEALTH CARE FACILITY: Primary | ICD-10-CM

## 2021-12-29 DIAGNOSIS — Z13.228 SCREENING FOR METABOLIC DISORDER: ICD-10-CM

## 2021-12-29 DIAGNOSIS — Z13.220 SCREENING FOR LIPID DISORDERS: ICD-10-CM

## 2021-12-29 DIAGNOSIS — Z13.0 SCREENING FOR DISORDER OF BLOOD AND BLOOD-FORMING ORGANS: ICD-10-CM

## 2021-12-29 DIAGNOSIS — Z13.29 SCREENING FOR THYROID DISORDER: ICD-10-CM

## 2021-12-29 NOTE — TELEPHONE ENCOUNTER
CPE   Future Appointments   Date Time Provider Lukasz Pedersen   12/29/2021 11:20 AM CRIS Paredes [facility]        Orders to  [facility]  aware must fast no call back required

## 2022-02-09 ENCOUNTER — TELEPHONE (OUTPATIENT)
Dept: INTERNAL MEDICINE CLINIC | Facility: CLINIC | Age: 27
End: 2022-02-09

## 2022-02-09 NOTE — TELEPHONE ENCOUNTER
Future Appointments   Date Time Provider Lukasz Pedersen   3/11/2022  1:40 PM Tony Wesley APRN [facility]     Orders to      THE [facility]       aware must fast no call back required

## 2022-02-26 ENCOUNTER — LAB ENCOUNTER (OUTPATIENT)
Dept: LAB | Facility: HOSPITAL | Age: 27
End: 2022-02-26
Attending: NURSE PRACTITIONER
Payer: COMMERCIAL

## 2022-02-26 DIAGNOSIS — Z13.220 SCREENING FOR LIPID DISORDERS: ICD-10-CM

## 2022-02-26 DIAGNOSIS — Z13.29 SCREENING FOR THYROID DISORDER: ICD-10-CM

## 2022-02-26 DIAGNOSIS — Z13.228 SCREENING FOR METABOLIC DISORDER: ICD-10-CM

## 2022-02-26 DIAGNOSIS — Z13.0 SCREENING FOR DISORDER OF BLOOD AND BLOOD-FORMING ORGANS: ICD-10-CM

## 2022-02-26 DIAGNOSIS — Z00.00 ROUTINE GENERAL MEDICAL EXAMINATION AT A HEALTH CARE FACILITY: ICD-10-CM

## 2022-02-26 LAB
ALBUMIN SERPL-MCNC: 3.4 G/DL (ref 3.4–5)
ALBUMIN/GLOB SERPL: 1 {RATIO} (ref 1–2)
ALP LIVER SERPL-CCNC: 84 U/L
ALT SERPL-CCNC: 38 U/L
ANION GAP SERPL CALC-SCNC: 5 MMOL/L (ref 0–18)
AST SERPL-CCNC: 19 U/L (ref 15–37)
BASOPHILS # BLD AUTO: 0.03 X10(3) UL (ref 0–0.2)
BASOPHILS NFR BLD AUTO: 0.3 %
BILIRUB SERPL-MCNC: 0.4 MG/DL (ref 0.1–2)
BUN BLD-MCNC: 12 MG/DL (ref 7–18)
CALCIUM BLD-MCNC: 8.9 MG/DL (ref 8.5–10.1)
CHLORIDE SERPL-SCNC: 105 MMOL/L (ref 98–112)
CHOLEST SERPL-MCNC: 138 MG/DL (ref ?–200)
CO2 SERPL-SCNC: 26 MMOL/L (ref 21–32)
CREAT BLD-MCNC: 0.71 MG/DL
EOSINOPHIL # BLD AUTO: 0.12 X10(3) UL (ref 0–0.7)
EOSINOPHIL NFR BLD AUTO: 1.3 %
ERYTHROCYTE [DISTWIDTH] IN BLOOD BY AUTOMATED COUNT: 12.6 %
FASTING PATIENT LIPID ANSWER: YES
FASTING STATUS PATIENT QL REPORTED: YES
GLOBULIN PLAS-MCNC: 3.4 G/DL (ref 2.8–4.4)
GLUCOSE BLD-MCNC: 83 MG/DL (ref 70–99)
HCT VFR BLD AUTO: 38.6 %
HDLC SERPL-MCNC: 38 MG/DL (ref 40–59)
HGB BLD-MCNC: 12.3 G/DL
IMM GRANULOCYTES # BLD AUTO: 0.03 X10(3) UL (ref 0–1)
IMM GRANULOCYTES NFR BLD: 0.3 %
LDLC SERPL CALC-MCNC: 81 MG/DL (ref ?–100)
LYMPHOCYTES # BLD AUTO: 3 X10(3) UL (ref 1–4)
LYMPHOCYTES NFR BLD AUTO: 31.3 %
MCH RBC QN AUTO: 28.5 PG (ref 26–34)
MCHC RBC AUTO-ENTMCNC: 31.9 G/DL (ref 31–37)
MCV RBC AUTO: 89.4 FL
MONOCYTES # BLD AUTO: 0.66 X10(3) UL (ref 0.1–1)
MONOCYTES NFR BLD AUTO: 6.9 %
NEUTROPHILS # BLD AUTO: 5.75 X10 (3) UL (ref 1.5–7.7)
NEUTROPHILS # BLD AUTO: 5.75 X10(3) UL (ref 1.5–7.7)
NEUTROPHILS NFR BLD AUTO: 59.9 %
NONHDLC SERPL-MCNC: 100 MG/DL (ref ?–130)
OSMOLALITY SERPL CALC.SUM OF ELEC: 281 MOSM/KG (ref 275–295)
PLATELET # BLD AUTO: 348 10(3)UL (ref 150–450)
POTASSIUM SERPL-SCNC: 4 MMOL/L (ref 3.5–5.1)
PROT SERPL-MCNC: 6.8 G/DL (ref 6.4–8.2)
RBC # BLD AUTO: 4.32 X10(6)UL
SODIUM SERPL-SCNC: 136 MMOL/L (ref 136–145)
TRIGL SERPL-MCNC: 103 MG/DL (ref 30–149)
TSI SER-ACNC: 2.35 MIU/ML (ref 0.36–3.74)
VLDLC SERPL CALC-MCNC: 16 MG/DL (ref 0–30)
WBC # BLD AUTO: 9.6 X10(3) UL (ref 4–11)

## 2022-02-26 PROCEDURE — 36415 COLL VENOUS BLD VENIPUNCTURE: CPT

## 2022-02-26 PROCEDURE — 85025 COMPLETE CBC W/AUTO DIFF WBC: CPT

## 2022-02-26 PROCEDURE — 80053 COMPREHEN METABOLIC PANEL: CPT

## 2022-02-26 PROCEDURE — 80061 LIPID PANEL: CPT

## 2022-02-26 PROCEDURE — 84443 ASSAY THYROID STIM HORMONE: CPT

## 2022-03-11 ENCOUNTER — OFFICE VISIT (OUTPATIENT)
Dept: INTERNAL MEDICINE CLINIC | Facility: CLINIC | Age: 27
End: 2022-03-11
Payer: COMMERCIAL

## 2022-03-11 VITALS
TEMPERATURE: 98 F | HEART RATE: 88 BPM | HEIGHT: 65 IN | SYSTOLIC BLOOD PRESSURE: 106 MMHG | BODY MASS INDEX: 47.48 KG/M2 | WEIGHT: 285 LBS | DIASTOLIC BLOOD PRESSURE: 74 MMHG

## 2022-03-11 DIAGNOSIS — F32.A ANXIETY AND DEPRESSION: ICD-10-CM

## 2022-03-11 DIAGNOSIS — G47.10 HYPERSOMNIA: ICD-10-CM

## 2022-03-11 DIAGNOSIS — R06.83 SNORING: ICD-10-CM

## 2022-03-11 DIAGNOSIS — Z91.09 ENVIRONMENTAL ALLERGIES: ICD-10-CM

## 2022-03-11 DIAGNOSIS — F84.0 AUTISM SPECTRUM DISORDER: ICD-10-CM

## 2022-03-11 DIAGNOSIS — F41.9 ANXIETY AND DEPRESSION: ICD-10-CM

## 2022-03-11 DIAGNOSIS — Z00.00 ROUTINE GENERAL MEDICAL EXAMINATION AT A HEALTH CARE FACILITY: Primary | ICD-10-CM

## 2022-03-11 PROCEDURE — 3078F DIAST BP <80 MM HG: CPT | Performed by: NURSE PRACTITIONER

## 2022-03-11 PROCEDURE — 90471 IMMUNIZATION ADMIN: CPT | Performed by: NURSE PRACTITIONER

## 2022-03-11 PROCEDURE — 3074F SYST BP LT 130 MM HG: CPT | Performed by: NURSE PRACTITIONER

## 2022-03-11 PROCEDURE — 90715 TDAP VACCINE 7 YRS/> IM: CPT | Performed by: NURSE PRACTITIONER

## 2022-03-11 PROCEDURE — 3008F BODY MASS INDEX DOCD: CPT | Performed by: NURSE PRACTITIONER

## 2022-03-11 PROCEDURE — 99395 PREV VISIT EST AGE 18-39: CPT | Performed by: NURSE PRACTITIONER

## 2022-03-11 RX ORDER — BUSPIRONE HYDROCHLORIDE 10 MG/1
10 TABLET ORAL 2 TIMES DAILY
COMMUNITY
Start: 2022-02-14

## 2022-05-17 ENCOUNTER — TELEPHONE (OUTPATIENT)
Dept: INTERNAL MEDICINE CLINIC | Facility: CLINIC | Age: 27
End: 2022-05-17

## 2022-05-17 DIAGNOSIS — Z91.09 ENVIRONMENTAL ALLERGIES: Primary | ICD-10-CM

## 2022-05-17 NOTE — TELEPHONE ENCOUNTER
Patient notified/referred to Dr Stone Prieto and Dr Cosme Son, info given, notified she will need to check with her insurance to see if these specialists are covered by her insurance. Pt verbalizes understanding.

## 2022-05-17 NOTE — TELEPHONE ENCOUNTER
Dr Martha Oconnor (uncertain if that is who she is referring to in the note regarding flexibility) or Dr Felisha Teague

## 2022-09-12 ENCOUNTER — OFFICE VISIT (OUTPATIENT)
Dept: FAMILY MEDICINE CLINIC | Facility: CLINIC | Age: 27
End: 2022-09-12
Payer: COMMERCIAL

## 2022-09-12 VITALS
HEART RATE: 96 BPM | HEIGHT: 65 IN | TEMPERATURE: 99 F | BODY MASS INDEX: 43.32 KG/M2 | OXYGEN SATURATION: 99 % | WEIGHT: 260 LBS | RESPIRATION RATE: 16 BRPM

## 2022-09-12 DIAGNOSIS — H92.03 OTALGIA OF BOTH EARS: Primary | ICD-10-CM

## 2022-11-22 ENCOUNTER — IMMUNIZATION (OUTPATIENT)
Dept: FAMILY MEDICINE CLINIC | Facility: CLINIC | Age: 27
End: 2022-11-22
Payer: COMMERCIAL

## 2022-11-22 DIAGNOSIS — Z23 NEEDS FLU SHOT: Primary | ICD-10-CM

## 2022-11-22 PROCEDURE — 90686 IIV4 VACC NO PRSV 0.5 ML IM: CPT | Performed by: NURSE PRACTITIONER

## 2022-11-22 PROCEDURE — 90471 IMMUNIZATION ADMIN: CPT | Performed by: NURSE PRACTITIONER

## 2023-06-09 ENCOUNTER — E-VISIT (OUTPATIENT)
Dept: TELEHEALTH | Age: 28
End: 2023-06-09
Payer: COMMERCIAL

## 2023-06-09 DIAGNOSIS — R39.9 UTI SYMPTOMS: Primary | ICD-10-CM

## 2023-06-09 PROCEDURE — 99421 OL DIG E/M SVC 5-10 MIN: CPT | Performed by: NURSE PRACTITIONER

## 2023-06-09 RX ORDER — NITROFURANTOIN 25; 75 MG/1; MG/1
CAPSULE ORAL
Qty: 14 CAPSULE | Refills: 0 | Status: SHIPPED
Start: 2023-06-09

## 2023-06-10 NOTE — PROGRESS NOTES
Refer to patient Questionnaire and responses. See MyChart messages. 10 minutes spent in direct communication with patient via [facility].

## 2023-08-23 ENCOUNTER — TELEPHONE (OUTPATIENT)
Dept: INTERNAL MEDICINE CLINIC | Facility: CLINIC | Age: 28
End: 2023-08-23

## 2023-08-23 DIAGNOSIS — Z13.220 SCREENING FOR LIPID DISORDERS: ICD-10-CM

## 2023-08-23 DIAGNOSIS — Z13.29 SCREENING FOR THYROID DISORDER: ICD-10-CM

## 2023-08-23 DIAGNOSIS — Z13.0 SCREENING FOR DISORDER OF BLOOD AND BLOOD-FORMING ORGANS: ICD-10-CM

## 2023-08-23 DIAGNOSIS — Z00.00 ROUTINE GENERAL MEDICAL EXAMINATION AT A HEALTH CARE FACILITY: Primary | ICD-10-CM

## 2023-08-23 DIAGNOSIS — Z13.228 SCREENING FOR METABOLIC DISORDER: ICD-10-CM

## 2023-09-20 ENCOUNTER — OFFICE VISIT (OUTPATIENT)
Dept: FAMILY MEDICINE CLINIC | Facility: CLINIC | Age: 28
End: 2023-09-20
Payer: COMMERCIAL

## 2023-09-20 VITALS
HEIGHT: 64 IN | BODY MASS INDEX: 42.68 KG/M2 | WEIGHT: 250 LBS | RESPIRATION RATE: 18 BRPM | HEART RATE: 111 BPM | SYSTOLIC BLOOD PRESSURE: 100 MMHG | DIASTOLIC BLOOD PRESSURE: 72 MMHG | OXYGEN SATURATION: 96 % | TEMPERATURE: 98 F

## 2023-09-20 DIAGNOSIS — B34.9 VIRAL ILLNESS: Primary | ICD-10-CM

## 2023-09-20 PROCEDURE — 99213 OFFICE O/P EST LOW 20 MIN: CPT | Performed by: FAMILY MEDICINE

## 2023-09-20 PROCEDURE — 3078F DIAST BP <80 MM HG: CPT | Performed by: FAMILY MEDICINE

## 2023-09-20 PROCEDURE — 3008F BODY MASS INDEX DOCD: CPT | Performed by: FAMILY MEDICINE

## 2023-09-20 PROCEDURE — 87635 SARS-COV-2 COVID-19 AMP PRB: CPT | Performed by: FAMILY MEDICINE

## 2023-09-20 PROCEDURE — 3074F SYST BP LT 130 MM HG: CPT | Performed by: FAMILY MEDICINE

## 2023-09-20 NOTE — PATIENT INSTRUCTIONS
Your testing will be back in 24-36 hours. Use OTC meds for comfort as needed--  Ibuprofen/Tylenol for fever/pain  Use Benadryl at bedtime to reduce drainage and promote rest.  Zyrtec/Claritin/Allegra in the AM to reduce nasal drainage without sedation. Use saline nasal sprays to reduce congestion and thin secretions. Use Delsym for cough. Consider applying shorty's vapo-rub or eucayptus oil to chest and feet at bedtime to reduce chest and nasal congestion. Warm tea with honey, cough lozenges, vaporizers/steam etc.    If no better in 2-3 days, follow-up with your PCP for further evaluation.

## 2023-09-21 ENCOUNTER — TELEPHONE (OUTPATIENT)
Dept: FAMILY MEDICINE CLINIC | Facility: CLINIC | Age: 28
End: 2023-09-21

## 2023-09-21 LAB — SARS-COV-2 RNA RESP QL NAA+PROBE: NOT DETECTED

## 2023-09-30 ENCOUNTER — HOSPITAL ENCOUNTER (OUTPATIENT)
Dept: LAB | Facility: HOSPITAL | Age: 28
Discharge: HOME | End: 2023-09-30
Attending: NURSE PRACTITIONER
Payer: COMMERCIAL

## 2023-09-30 DIAGNOSIS — Z00.00 ROUTINE GENERAL MEDICAL EXAMINATION AT A HEALTH CARE FACILITY: ICD-10-CM

## 2023-09-30 DIAGNOSIS — Z13.29 SCREENING FOR THYROID DISORDER: ICD-10-CM

## 2023-09-30 DIAGNOSIS — Z13.228 SCREENING FOR METABOLIC DISORDER: ICD-10-CM

## 2023-09-30 DIAGNOSIS — Z13.0 SCREENING FOR DISORDER OF BLOOD AND BLOOD-FORMING ORGANS: ICD-10-CM

## 2023-09-30 DIAGNOSIS — Z13.220 SCREENING FOR LIPID DISORDERS: ICD-10-CM

## 2023-09-30 LAB
ALBUMIN SERPL-MCNC: 3.5 G/DL (ref 3.4–5)
ALBUMIN/GLOB SERPL: 0.9 {RATIO} (ref 1–2)
ALP LIVER SERPL-CCNC: 84 U/L
ALT SERPL-CCNC: 39 U/L
ANION GAP SERPL CALC-SCNC: 5 MMOL/L (ref 0–18)
AST SERPL-CCNC: 18 U/L (ref 15–37)
BASOPHILS # BLD AUTO: 0.04 X10(3) UL (ref 0–0.2)
BASOPHILS NFR BLD AUTO: 0.5 %
BILIRUB SERPL-MCNC: 0.4 MG/DL (ref 0.1–2)
BUN BLD-MCNC: 10 MG/DL (ref 7–18)
CALCIUM BLD-MCNC: 8.7 MG/DL (ref 8.5–10.1)
CHLORIDE SERPL-SCNC: 106 MMOL/L (ref 98–112)
CHOLEST SERPL-MCNC: 152 MG/DL (ref ?–200)
CO2 SERPL-SCNC: 26 MMOL/L (ref 21–32)
CREAT BLD-MCNC: 0.82 MG/DL
EGFRCR SERPLBLD CKD-EPI 2021: 101 ML/MIN/1.73M2 (ref 60–?)
EOSINOPHIL # BLD AUTO: 0.09 X10(3) UL (ref 0–0.7)
EOSINOPHIL NFR BLD AUTO: 1 %
ERYTHROCYTE [DISTWIDTH] IN BLOOD BY AUTOMATED COUNT: 12.6 %
FASTING PATIENT LIPID ANSWER: YES
FASTING STATUS PATIENT QL REPORTED: YES
GLOBULIN PLAS-MCNC: 4 G/DL (ref 2.8–4.4)
GLUCOSE BLD-MCNC: 94 MG/DL (ref 70–99)
HCT VFR BLD AUTO: 39.3 %
HDLC SERPL-MCNC: 38 MG/DL (ref 40–59)
HGB BLD-MCNC: 13.2 G/DL
IMM GRANULOCYTES # BLD AUTO: 0.04 X10(3) UL (ref 0–1)
IMM GRANULOCYTES NFR BLD: 0.5 %
LDLC SERPL CALC-MCNC: 93 MG/DL (ref ?–100)
LYMPHOCYTES # BLD AUTO: 3.32 X10(3) UL (ref 1–4)
LYMPHOCYTES NFR BLD AUTO: 38.5 %
MCH RBC QN AUTO: 28.4 PG (ref 26–34)
MCHC RBC AUTO-ENTMCNC: 33.6 G/DL (ref 31–37)
MCV RBC AUTO: 84.5 FL
MONOCYTES # BLD AUTO: 0.45 X10(3) UL (ref 0.1–1)
MONOCYTES NFR BLD AUTO: 5.2 %
NEUTROPHILS # BLD AUTO: 4.69 X10 (3) UL (ref 1.5–7.7)
NEUTROPHILS # BLD AUTO: 4.69 X10(3) UL (ref 1.5–7.7)
NEUTROPHILS NFR BLD AUTO: 54.3 %
NONHDLC SERPL-MCNC: 114 MG/DL (ref ?–130)
OSMOLALITY SERPL CALC.SUM OF ELEC: 283 MOSM/KG (ref 275–295)
PLATELET # BLD AUTO: 400 10(3)UL (ref 150–450)
POTASSIUM SERPL-SCNC: 3.9 MMOL/L (ref 3.5–5.1)
PROT SERPL-MCNC: 7.5 G/DL (ref 6.4–8.2)
RBC # BLD AUTO: 4.65 X10(6)UL
SODIUM SERPL-SCNC: 137 MMOL/L (ref 136–145)
TRIGL SERPL-MCNC: 116 MG/DL (ref 30–149)
TSI SER-ACNC: 1.39 MIU/ML (ref 0.36–3.74)
VLDLC SERPL CALC-MCNC: 19 MG/DL (ref 0–30)
WBC # BLD AUTO: 8.6 X10(3) UL (ref 4–11)

## 2023-09-30 PROCEDURE — 80061 LIPID PANEL: CPT | Performed by: NURSE PRACTITIONER

## 2023-09-30 PROCEDURE — 80053 COMPREHEN METABOLIC PANEL: CPT | Performed by: NURSE PRACTITIONER

## 2023-09-30 PROCEDURE — 84443 ASSAY THYROID STIM HORMONE: CPT | Performed by: NURSE PRACTITIONER

## 2023-09-30 PROCEDURE — 36415 COLL VENOUS BLD VENIPUNCTURE: CPT | Performed by: NURSE PRACTITIONER

## 2023-09-30 PROCEDURE — 85025 COMPLETE CBC W/AUTO DIFF WBC: CPT | Performed by: NURSE PRACTITIONER

## 2023-10-02 ENCOUNTER — OFFICE VISIT (OUTPATIENT)
Dept: INTERNAL MEDICINE CLINIC | Facility: CLINIC | Age: 28
End: 2023-10-02
Payer: COMMERCIAL

## 2023-10-02 VITALS
HEIGHT: 64 IN | BODY MASS INDEX: 49 KG/M2 | TEMPERATURE: 97 F | WEIGHT: 287 LBS | OXYGEN SATURATION: 98 % | HEART RATE: 86 BPM | SYSTOLIC BLOOD PRESSURE: 114 MMHG | RESPIRATION RATE: 18 BRPM | DIASTOLIC BLOOD PRESSURE: 70 MMHG

## 2023-10-02 DIAGNOSIS — Z00.00 ROUTINE GENERAL MEDICAL EXAMINATION AT A HEALTH CARE FACILITY: Primary | ICD-10-CM

## 2023-10-02 DIAGNOSIS — Z11.3 SCREEN FOR STD (SEXUALLY TRANSMITTED DISEASE): ICD-10-CM

## 2023-10-02 DIAGNOSIS — F41.9 ANXIETY AND DEPRESSION: ICD-10-CM

## 2023-10-02 DIAGNOSIS — F42.2 MIXED OBSESSIONAL THOUGHTS AND ACTS: ICD-10-CM

## 2023-10-02 DIAGNOSIS — Z12.4 SCREENING FOR CERVICAL CANCER: ICD-10-CM

## 2023-10-02 DIAGNOSIS — F32.A ANXIETY AND DEPRESSION: ICD-10-CM

## 2023-10-02 DIAGNOSIS — F84.0 AUTISM SPECTRUM DISORDER: ICD-10-CM

## 2023-10-02 PROCEDURE — 87491 CHLMYD TRACH DNA AMP PROBE: CPT | Performed by: NURSE PRACTITIONER

## 2023-10-02 PROCEDURE — 3074F SYST BP LT 130 MM HG: CPT | Performed by: NURSE PRACTITIONER

## 2023-10-02 PROCEDURE — 88175 CYTOPATH C/V AUTO FLUID REDO: CPT | Performed by: NURSE PRACTITIONER

## 2023-10-02 PROCEDURE — 90686 IIV4 VACC NO PRSV 0.5 ML IM: CPT | Performed by: NURSE PRACTITIONER

## 2023-10-02 PROCEDURE — 99395 PREV VISIT EST AGE 18-39: CPT | Performed by: NURSE PRACTITIONER

## 2023-10-02 PROCEDURE — 90651 9VHPV VACCINE 2/3 DOSE IM: CPT | Performed by: NURSE PRACTITIONER

## 2023-10-02 PROCEDURE — 87591 N.GONORRHOEAE DNA AMP PROB: CPT | Performed by: NURSE PRACTITIONER

## 2023-10-02 PROCEDURE — 90471 IMMUNIZATION ADMIN: CPT | Performed by: NURSE PRACTITIONER

## 2023-10-02 PROCEDURE — 3008F BODY MASS INDEX DOCD: CPT | Performed by: NURSE PRACTITIONER

## 2023-10-02 PROCEDURE — 90472 IMMUNIZATION ADMIN EACH ADD: CPT | Performed by: NURSE PRACTITIONER

## 2023-10-02 PROCEDURE — 3078F DIAST BP <80 MM HG: CPT | Performed by: NURSE PRACTITIONER

## 2023-10-03 LAB
C TRACH DNA SPEC QL NAA+PROBE: NEGATIVE
N GONORRHOEA DNA SPEC QL NAA+PROBE: NEGATIVE

## 2024-01-01 NOTE — ED PROVIDER NOTES
Baby transferred to [facility] with mother around 2330 last night. Baby very fussy and per report from L&D nurse and mother/father baby not feeding really well and spitting up undigested food in large volumes about 2 hours after a feed. Upon questioning mother, mother reported that she is severely lactose intolerant and only consumed soy or almond milk during pregnancy. Switched baby to similac soy based formula and spitting up, fussiness, and difficulty feeding resolved. No order for a a specific formula to be given noted in chart. First feed with soy based formula baby took 30ml with no problem which has been the most baby has taken at one feed. MD informed. Patient afebrile this shift. Voids and stools. Bonding well with both mother and father; both respond to infant cues and participate in infant care. Vital signs stable at this time. Will continue to monitor.     Patient Seen in: [facility] Emergency Department    History   Patient presents with:  Eval-P (psychiatric)    Stated Complaint: [facility] eval    HPI    Patient presented to [facility] for evaluation after she had taken multiple doses of Atarax No peripheral edema or evidence of DVT.        ED Course     Labs Reviewed   ACETAMINOPHEN (TYLENOL), S - Abnormal; Notable for the following components:       Result Value    Acetaminophen <2.0 (*)     All other components within normal limits   SALICYLATE

## 2024-04-01 ENCOUNTER — OFFICE VISIT (OUTPATIENT)
Dept: INTERNAL MEDICINE CLINIC | Facility: CLINIC | Age: 29
End: 2024-04-01
Payer: COMMERCIAL

## 2024-04-01 VITALS
SYSTOLIC BLOOD PRESSURE: 114 MMHG | HEART RATE: 92 BPM | RESPIRATION RATE: 22 BRPM | DIASTOLIC BLOOD PRESSURE: 62 MMHG | WEIGHT: 293 LBS | OXYGEN SATURATION: 98 % | HEIGHT: 64 IN | BODY MASS INDEX: 50.02 KG/M2

## 2024-04-01 DIAGNOSIS — F32.A ANXIETY AND DEPRESSION: ICD-10-CM

## 2024-04-01 DIAGNOSIS — F41.9 ANXIETY AND DEPRESSION: ICD-10-CM

## 2024-04-01 DIAGNOSIS — E66.01 MORBID OBESITY WITH BMI OF 50.0-59.9, ADULT (HCC): Primary | ICD-10-CM

## 2024-04-01 PROCEDURE — 99213 OFFICE O/P EST LOW 20 MIN: CPT | Performed by: NURSE PRACTITIONER

## 2024-04-01 NOTE — PROGRESS NOTES
Yun Agosto is a 28 year old female.    Chief Complaint   Patient presents with    Follow - Up     Rm 11 kb       HPI:   here for 6 mo follow up   she is not sure why she is here today   no complaints.    Overall doing well  her anxiety and depression are managed by psych.  Has been overall ok.  Some breakthrough anxiety    Her weight is up  Has not had the opportunity to exercise as much  was swimming more but the pool she went to changed their hours so is not convenient.  Some hindrances have been school and weather.  She is hoping to walk more with the nicer weather.  She does track her food but sometimes exacerbates her anxiety.  Discussed weight loss clinic  she is not interested      Seasonal allergies.   Nasal spray.  Some exacerbation.      Patient Active Problem List   Diagnosis    Dysthymic disorder    Sprain of calcaneofibular (ligament) of ankle    Anxiety and depression    Severe recurrent major depression without psychotic features (HCC)    Autism spectrum disorder (HCC)    Anxiety disorder    Obsessive compulsive disorder     Current Outpatient Medications   Medication Sig Dispense Refill    busPIRone 10 MG Oral Tab Take 1 tablet (10 mg total) by mouth 2 (two) times daily.      Fluticasone Propionate 50 MCG/ACT Nasal Suspension 2 sprays by Each Nare route daily. 3 Inhaler 3    EPINEPHrine (AUVI-Q) 0.3 MG/0.3ML Injection Solution Auto-injector Use as directed for anaphylaxis, then call 911. May dispense brand or generic. 1 duopack. 1 each 1    Desvenlafaxine Succinate  MG Oral Tablet 24 Hr Take 1 tablet (100 mg total) by mouth daily.      Cholecalciferol (VITAMIN D) 2000 units Oral Cap Take by mouth.      Fexofenadine HCl (ALLEGRA OR) Take 180 mg by mouth daily.          Past Medical History:   Diagnosis Date    Generalized anxiety disorder 8/28/2012      Social History:  Social History     Socioeconomic History    Marital status: Single   Tobacco Use    Smoking status: Never    Smokeless  tobacco: Never   Substance and Sexual Activity    Alcohol use: No    Drug use: No    Sexual activity: Never     No family history on file.     Allergies  Allergies   Allergen Reactions    Other SHORTNESS OF BREATH     Cats and dog    Latex ITCHING    Pollen ITCHING       REVIEW OF SYSTEMS:   GENERAL HEALTH: feels well otherwise  RESPIRATORY: denies shortness of breath with exertion, no cough  CARDIOVASCULAR: denies chest pain on exertion, no palpatations  GI: denies abdominal pain and denies heartburn, no diarrhea or constipation    EXAM:   /62   Pulse 92   Resp 22   Ht 5' 4\" (1.626 m)   Wt (!) 304 lb 9.6 oz (138.2 kg)   LMP  (LMP Unknown)   SpO2 98%   BMI 52.28 kg/m²   GENERAL: well developed, well nourished,in no apparent distress    LUNGS: normal rate without respiratory distress, lungs clear to auscultation  CARDIO: RRR without murmur  PSYCH: alert and oriented x 3    ASSESSMENT AND PLAN:     Encounter Diagnoses   Name Primary?    Morbid obesity with BMI of 50.0-59.9, adult ([facility])  counseling given.  She is not interested in weight loss clinic  encouraged diet and exercise.  Hoping the nice weather will help.   Yes    Anxiety and depression  per psych.          No orders of the defined types were placed in this encounter.      Meds & Refills for this Visit:  Requested Prescriptions      No prescriptions requested or ordered in this encounter       Imaging & Consults:  None    No follow-ups on file.  There are no Patient Instructions on file for this visit.

## 2024-06-04 ENCOUNTER — TELEPHONE (OUTPATIENT)
Dept: INTERNAL MEDICINE CLINIC | Facility: CLINIC | Age: 29
End: 2024-06-04

## 2024-06-04 ENCOUNTER — TELEPHONE (OUTPATIENT)
Dept: SLEEP CENTER | Age: 29
End: 2024-06-04

## 2024-06-04 DIAGNOSIS — R06.83 SNORING: Primary | ICD-10-CM

## 2024-06-04 DIAGNOSIS — F33.2 SEVERE RECURRENT MAJOR DEPRESSION WITHOUT PSYCHOTIC FEATURES (HCC): ICD-10-CM

## 2024-06-04 DIAGNOSIS — E66.01 MORBID OBESITY (HCC): ICD-10-CM

## 2024-06-04 DIAGNOSIS — G47.10 HYPERSOMNIA: ICD-10-CM

## 2024-06-04 NOTE — TELEPHONE ENCOUNTER
----- Message from Mariama TRIPP sent at 2024  8:37 AM CDT -----  Regarding: Pt requesting sleep study  Order is  . Please advise     Mariama [facility]

## 2024-06-20 ENCOUNTER — OFFICE VISIT (OUTPATIENT)
Dept: SLEEP CENTER | Age: 29
End: 2024-06-20
Attending: Other

## 2024-06-20 DIAGNOSIS — E66.01 MORBID OBESITY (HCC): ICD-10-CM

## 2024-06-20 DIAGNOSIS — R06.83 SNORING: ICD-10-CM

## 2024-06-20 DIAGNOSIS — G47.10 HYPERSOMNIA: ICD-10-CM

## 2024-06-20 DIAGNOSIS — F33.2 SEVERE RECURRENT MAJOR DEPRESSION WITHOUT PSYCHOTIC FEATURES (HCC): ICD-10-CM

## 2024-06-20 PROCEDURE — 95806 SLEEP STUDY UNATT&RESP EFFT: CPT

## 2024-06-28 ENCOUNTER — SLEEP STUDY (OUTPATIENT)
Facility: CLINIC | Age: 29
End: 2024-06-28
Payer: COMMERCIAL

## 2024-06-28 DIAGNOSIS — G47.9 SLEEP DISORDER: Primary | ICD-10-CM

## 2024-06-28 PROCEDURE — 95806 SLEEP STUDY UNATT&RESP EFFT: CPT | Performed by: OTHER

## 2025-01-07 ENCOUNTER — OFFICE VISIT (OUTPATIENT)
Dept: OBGYN CLINIC | Facility: CLINIC | Age: 30
End: 2025-01-07
Payer: COMMERCIAL

## 2025-01-07 VITALS
SYSTOLIC BLOOD PRESSURE: 116 MMHG | BODY MASS INDEX: 51 KG/M2 | HEART RATE: 84 BPM | DIASTOLIC BLOOD PRESSURE: 72 MMHG | WEIGHT: 293 LBS

## 2025-01-07 DIAGNOSIS — E66.01 CLASS 3 SEVERE OBESITY WITHOUT SERIOUS COMORBIDITY WITH BODY MASS INDEX (BMI) OF 50.0 TO 59.9 IN ADULT, UNSPECIFIED OBESITY TYPE (HCC): ICD-10-CM

## 2025-01-07 DIAGNOSIS — E66.813 CLASS 3 SEVERE OBESITY WITHOUT SERIOUS COMORBIDITY WITH BODY MASS INDEX (BMI) OF 50.0 TO 59.9 IN ADULT, UNSPECIFIED OBESITY TYPE (HCC): ICD-10-CM

## 2025-01-07 DIAGNOSIS — N93.9 ABNORMAL UTERINE BLEEDING (AUB): Primary | ICD-10-CM

## 2025-01-07 PROCEDURE — 99204 OFFICE O/P NEW MOD 45 MIN: CPT | Performed by: OBSTETRICS & GYNECOLOGY

## 2025-01-07 NOTE — PROGRESS NOTES
[facility]  Obstetrics and Gynecology Referral  History & Physical    CC: Patient is a new patient and referred for AUB concerns     Subjective:     HPI: Yun Agosto is a 29 year old  female referred for AUB concerns. Patient reports she is concerned about possible PCOS. Sister with PCOS. She reports menses are 25-30 days lasting about 5-7. Light to moderate with weight gain. She reports pads changed every 3-4 hours on average. +passage of blood clots. Cramping pain. Reports one or two course hairs on chin or breast. Reports acne and worse around her menses. Reports acne can be large and cystic.     Of note, the patient reports she will at times feel like her breasts are overheated at night.  Patient wears nonwire bra during the day. Reports room may be warm as well. Hx of vulvar cyst but no current symptoms today.     OB History:  OB History    Para Term  AB Living   0 0 0 0 0 0   SAB IAB Ectopic Multiple Live Births   0 0 0 0 0       Gyne History:  Menarche: [age]  Period Cycle (Days): monthly, 25-30 days  Period Duration (Days): 5-7 days  Period Flow: moderate  Use of Birth Control (if yes, specify type): None  Hx Prior Abnormal Pap: No  Pap Date: 10/02/23  Pap Result Notes: [facility]  Patient's last menstrual period was 2024 (within days).      denies history of STDs (non-HPV).  Sexual history: Active? Never   Birth control?none     Meds:  Medications Ordered Prior to Encounter[1]    All:  Allergies[2]    PMH:  Past Medical History:    Generalized anxiety disorder       PSH:  Past Surgical History:   Procedure Laterality Date    Hc implant ear tubes         Social History:  Social History     Socioeconomic History    Marital status: Single     Spouse name: Not on file    Number of children: Not on file    Years of education: Not on file    Highest education level: Not on file   Occupational History    Not on file   Tobacco Use    Smoking status: Never    Smokeless tobacco:  Never   Vaping Use    Vaping status: Never Used   Substance and Sexual Activity    Alcohol use: No    Drug use: No    Sexual activity: Never   Other Topics Concern     Service Not Asked    Blood Transfusions Not Asked    Caffeine Concern No    Occupational Exposure Not Asked    Hobby Hazards Not Asked    Sleep Concern Not Asked    Stress Concern Not Asked    Weight Concern Not Asked    Special Diet Not Asked    Back Care Not Asked    Exercise Yes    Bike Helmet Not Asked    Seat Belt Yes    Self-Exams Not Asked   Social History Narrative    Not on file     Social Drivers of Health     Financial Resource Strain: Not on file   Food Insecurity: Not on file   Transportation Needs: Not on file   Physical Activity: Not on file   Stress: Not on file   Social Connections: Not on file   Housing Stability: Not on file         Family History:  Family History   Problem Relation Age of Onset    Other (Other) Sister         PCOS       Review of Systems:  General: no complaints per category. See HPI for additional information.   Breast: no complaints per category. See HPI for additional information.   Respiratory: no complaints per category. See HPI for additional information.   Cardiovascular: no complaints per category. See HPI for additional information.   GI: no complaints per category. See HPI for additional information.   : no complaints per category. See HPI for additional information.   Heme: no complaints per category. See HPI for additional information.       Objective:     Vitals:    25 1116   BP: 116/72   Pulse: 84   Weight: 298 lb (135.2 kg)         Body mass index is 51.15 kg/m².    General: AAO.NAD.   CVS exam: normal peripheral perfusion  Chest: non-labored breathing, no tachypnea   Abdominal exam: deferred  Pelvic exam: deferred      Imaging:  ordered    Assessment:     Yun Agosto is a 29 year old  female referred for AUB.         Plan:     Problem List Items Addressed This Visit     None  Visit Diagnoses       Abnormal uterine bleeding (AUB)    -  Primary    Relevant Medications    Magnesium 100 MG Oral Cap    Other Relevant Orders    CBC W Differential W Platelet    FSH    Estradiol    Prolactin    TSH W Reflex To Free T4    LH (Luteinizing Hormone) [E]    Comp Metabolic Panel (14)    Lipid Panel    Hemoglobin A1C    Testosterone, Total And Free    US PELVIS W EV (CPT=76856/65863)    Class 3 severe obesity without serious comorbidity with body mass index (BMI) of 50.0 to 59.9 in adult, unspecified obesity type (HCC)        Relevant Orders    [facility] [place] [facility]              PCOS, concern for possible PCOS   - labs ordered  - pelvic US ordered   - d/w patient PCOS including diagnosis, prognosis and management   - d/w patient management recommendations for PCOS including but not limited to weight management, menstrual regulation and routine health screening   - Discussed with patient risk of metabolic syndrome associate with PCOS including but not limited to risk of diabetes, hypertension and high cholesterol  - d/w patient recommendation for medical management for menstrual regulation if menses became irregular   - AUB precautions provided   - d/w patient possible impact of fertility w/ PCOS diagnosis  - however, pt informed that true fertility impact is unknown at this time until patient is ready to try for pregnancy and might need referral to [facility]  - osiris-inositol (MI) 2 g daily and D-chiro-inositol (DCI)  mg daily supplementation recommended, consider Ovasitol brand   - Discussed with patient possible metformin use recommendation with PCOS, pending hemoglobin A1c  Obesity  - recommend referral to weight loss clinic  - d/w patient diet and activity modifications          All of the findings and plan were discussed with the patient.  She notes understanding and agrees with the plan of care.  All questions were answered to the best of my ability at this  time.      Total patient time was 45 minutes in evaluation, consultation, and coordination of care.  This included face to face and non-face to face actions. The patient's questions and concerns were addressed.     RTC in 6 months for a well woman exam or sooner if needed     Fátima Bass MD   [facility] - OBGYN        Discussed with patient that there will not be further notification of normal or benign results other than receiving results on mychart. A Soflowhart message or telephone call will be placed by the physician and/or office staff if results are abnormal.     Note to patient and family   The 21st Century Cures Act makes medical notes available to patients in the interest of transparency.  However, please be advised that this is a medical document.  It is intended as [id number] communication.  It is written and medical language may contain abbreviations or verbiage that are technical and unfamiliar.  It may appear blunt or direct.  Medical documents are intended to carry relevant information, facts as evident, and the clinical opinion of the practitioner.        This note could include assistance by Dragon voice recognition. Errors in content may be related to improper recognition by the system; efforts to review and correct have been done but errors may still exist.            [1]   Current Outpatient Medications on File Prior to Visit   Medication Sig Dispense Refill    Magnesium 100 MG Oral Cap       busPIRone 10 MG Oral Tab Take 1 tablet (10 mg total) by mouth 2 (two) times daily.      EPINEPHrine (AUVI-Q) 0.3 MG/0.3ML Injection Solution Auto-injector Use as directed for anaphylaxis, then call 911. May dispense brand or generic. 1 duopack. 1 each 1    Desvenlafaxine Succinate  MG Oral Tablet 24 Hr Take 1 tablet (100 mg total) by mouth daily.      Fexofenadine HCl (ALLEGRA OR) Take 180 mg by mouth daily.         No current facility-administered medications on file prior to visit.   [2]    Allergies  Allergen Reactions    Latex ITCHING    Pollen ITCHING

## 2025-01-07 NOTE — PATIENT INSTRUCTIONS
Supplementation for PCOS:    Kaiden-inositol (MI) 2 g daily and D-chiro-inositol (DCI)  mg daily supplementation recommended, consider Ovasitol brand

## 2025-02-01 ENCOUNTER — HOSPITAL ENCOUNTER (OUTPATIENT)
Dept: ULTRASOUND IMAGING | Age: 30
Discharge: HOME | End: 2025-02-01
Attending: OBSTETRICS & GYNECOLOGY
Payer: COMMERCIAL

## 2025-02-01 DIAGNOSIS — N93.9 ABNORMAL UTERINE BLEEDING (AUB): ICD-10-CM

## 2025-02-01 PROCEDURE — 76856 US EXAM PELVIC COMPLETE: CPT | Performed by: OBSTETRICS & GYNECOLOGY

## 2025-02-01 PROCEDURE — 76830 TRANSVAGINAL US NON-OB: CPT | Performed by: OBSTETRICS & GYNECOLOGY

## 2025-02-03 ENCOUNTER — OFFICE VISIT (OUTPATIENT)
Facility: CLINIC | Age: 30
End: 2025-02-03
Payer: COMMERCIAL

## 2025-02-03 VITALS
RESPIRATION RATE: 16 BRPM | DIASTOLIC BLOOD PRESSURE: 70 MMHG | SYSTOLIC BLOOD PRESSURE: 114 MMHG | OXYGEN SATURATION: 96 % | BODY MASS INDEX: 50.02 KG/M2 | HEART RATE: 84 BPM | WEIGHT: 293 LBS | HEIGHT: 64 IN

## 2025-02-03 DIAGNOSIS — F84.0 AUTISM SPECTRUM DISORDER (HCC): ICD-10-CM

## 2025-02-03 DIAGNOSIS — F41.9 ANXIETY DISORDER, UNSPECIFIED TYPE: ICD-10-CM

## 2025-02-03 DIAGNOSIS — E66.01 CLASS 3 SEVERE OBESITY DUE TO EXCESS CALORIES WITHOUT SERIOUS COMORBIDITY WITH BODY MASS INDEX (BMI) OF 50.0 TO 59.9 IN ADULT (HCC): ICD-10-CM

## 2025-02-03 DIAGNOSIS — E66.813 CLASS 3 SEVERE OBESITY DUE TO EXCESS CALORIES WITHOUT SERIOUS COMORBIDITY WITH BODY MASS INDEX (BMI) OF 50.0 TO 59.9 IN ADULT (HCC): ICD-10-CM

## 2025-02-03 DIAGNOSIS — G47.33 OBSTRUCTIVE SLEEP APNEA: Primary | ICD-10-CM

## 2025-02-03 DIAGNOSIS — G47.10 HYPERSOMNIA: ICD-10-CM

## 2025-02-03 PROCEDURE — 99204 OFFICE O/P NEW MOD 45 MIN: CPT | Performed by: OTHER

## 2025-02-03 NOTE — PROGRESS NOTES
[facility] PULMONARY  SLEEP PROGRESS NOTE        HPI:   This is a 29 year old female coming in for   Chief Complaint   Patient presents with    New Patient     Pt here for sleep consult.  S/S performed 7/2/2024.  Pt states snoring, sore throat, feeling fatigued for the many years.        HPI: asked to have sleep study, she does snore, fatigue , unrefreshing sleep  Trouble initiating sleep, and staying asleep  She works in a [employer]    Dx with JOSIAH with recent study  6/20/2024  FINDINGS:  The flow evaluation recording time began at 11:11 p.m. and ended at 10:01 a.m. for a duration of 10 hours and 50 minute.  Light snoring was recorded.  There was a total of 79 hypopneas and 2 apneas identified for an overall apnea-hypopnea index of 7.5.  Sleep was seen in both supine and non-supine positions.  The supine index was 9.3, non-supine was 5.2.  SaO2 esmer was 77%.  Average pulse was 66.     IMPRESSION:  Overall mild obstructive sleep apnea with AHI of 7.5 and SaO2 esmer of 77%    She is a   And works in the [employer]    Sleep schedule  Goes to bed 9pm or midnight  - until 720  May wakes 1-2 times a night  Avg sleep 6-7, and on weekends about 8-10 hours    Patient: Sleep review of systems today: see form.      Pt  PCP:  Susana Chaparro MD  No referring provider defined for this encounter.           No data to display                    Past Medical History:    Allergic rhinitis    Anxiety    Asthma (HCC)    Depression    Generalized anxiety disorder    Obesity    Sleep apnea     Past Surgical History:   Procedure Laterality Date    Hc implant ear tubes      Tubal ligation       Social History:  Social History     Social History Narrative    Not on file     Social History     Socioeconomic History    Marital status: Single   Tobacco Use    Smoking status: Never    Smokeless tobacco: Never   Vaping Use    Vaping status: Never Used   Substance and Sexual Activity    Alcohol use: No    Drug use: No    Sexual  activity: Never   Other Topics Concern    Caffeine Concern No    Exercise Yes    Seat Belt Yes     Family History:  Family History   Problem Relation Age of Onset    Arthritis Father     Arthritis Mother     No Known Problems Maternal Grandmother     Cancer Maternal Grandfather     Vision loss Paternal Grandmother     Stroke Paternal Grandfather     Other (Other) Sister         PCOS     Allergies:  Allergies[1]  Current Meds:  Current Outpatient Medications   Medication Sig Dispense Refill    Magnesium 100 MG Oral Cap       busPIRone 10 MG Oral Tab Take 1 tablet (10 mg total) by mouth 2 (two) times daily.      EPINEPHrine (AUVI-Q) 0.3 MG/0.3ML Injection Solution Auto-injector Use as directed for anaphylaxis, then call 911. May dispense brand or generic. 1 duopack. 1 each 1    Desvenlafaxine Succinate  MG Oral Tablet 24 Hr Take 1 tablet (100 mg total) by mouth daily.      Fexofenadine HCl (ALLEGRA OR) Take 180 mg by mouth daily.   (Patient not taking: Reported on 2/3/2025)        Counseling given: Not Answered         Problem List:  Patient Active Problem List   Diagnosis    Dysthymic disorder    Sprain of calcaneofibular (ligament) of ankle    Anxiety and depression    Severe recurrent major depression without psychotic features (HCC)    Autism spectrum disorder (HCC)    Anxiety disorder    Obsessive compulsive disorder    Obstructive sleep apnea    Class 3 severe obesity due to excess calories without serious comorbidity with body mass index (BMI) of 50.0 to 59.9 in adult (HCC)    Hypersomnia       REVIEW OF SYSTEMS:   Review of Systems    EXAM:   /70   Pulse 84   Resp 16   Ht 5' 4\" (1.626 m)   Wt 294 lb (133.4 kg)   LMP 12/30/2024 (Within Days)   SpO2 96%   BMI 50.46 kg/m²  Estimated body mass index is 50.46 kg/m² as calculated from the following:    Height as of this encounter: 5' 4\" (1.626 m).    Weight as of this encounter: 294 lb (133.4 kg).   Neck in inches:      Wt Readings from Last 6  Encounters:   02/03/25 294 lb (133.4 kg)   01/07/25 298 lb (135.2 kg)   04/01/24 (!) 304 lb 9.6 oz (138.2 kg)   10/02/23 287 lb (130.2 kg)   09/20/23 250 lb (113.4 kg)   09/12/22 260 lb (117.9 kg)     BP Readings from Last 3 Encounters:   02/03/25 114/70   01/07/25 116/72   04/01/24 114/62     Pulse Readings from Last 3 Encounters:   02/03/25 84   01/07/25 84   04/01/24 92     SpO2 Readings from Last 3 Encounters:   02/03/25 96%   04/01/24 98%   10/02/23 98%      Ideal body weight: 54.7 kg (120 lb 9.5 oz)  Adjusted ideal body weight: 86.2 kg (189 lb 15.3 oz)    Vital signs reviewed.  Physical Exam    ASSESSMENT AND PLAN:   1. Obstructive sleep apnea  The pathophysiology and mechanisms of obstructive sleep apnea were explained.  Adverse health consequences seen in association with JOSIAH include increased risk of cardiovascular events , cerebrovascular events, hypertension,  diabetes. Treatment options were discussed.  Positive airway pressure therapy is the gold standard.  Other options include mandibular advancement devices, evaluation of the upper airway by ear nose throat specialist, inspire therapy, and weight loss to be used in conjunction.     CPAP trial  31-90 day followup      2. Class 3 severe obesity due to excess calories without serious comorbidity with body mass index (BMI) of 50.0 to 59.9 in adult ([facility])    3. Hypersomnia    4. Autism spectrum disorder ([facility])    5. Anxiety disorder, unspecified type    There are no Patient Instructions on file for this visit.    Independent interpretation of Sleep Download as defined above.  Continue with Rx management of Sleep apnea with PAP therapy.    COMPLIANCE is required by insurance for 4 hours a night most nights of the week.    Advised if still with sleep apnea and not using CPAP has a 7 fold increase in risk of heart attack, stroke, abnormal heart rhythm  and death,  increased risk of driving accidents.     Advised to refrain from driving when sleepy.       Recommend weight loss, and maintain and optimal BMI with Exercise 30 minutes most days to target heart rate .     Advised patient to change filters,masks,hoses  and tubes and equiptment on a  regular schedule.    Filters and seals shall be changed every 1 month,  Hoses every 3 months,   CPAP mask and humidifier chamber changed every 6 month  with the durable medical equipment provider.         Meds & Refills for this Visit:  Requested Prescriptions      No prescriptions requested or ordered in this encounter       Outcome: Parent verbalizes understanding. Parent is notified to call with any questions, complications, allergies, or worsening or changing symptoms.  Parent is to call with any side effects or complications from the treatments as a result of today.     \" This note was created utilizing Dragon speech recognition software.  Please excuse any grammatical errors. Call my office if you have any questions regarding this note. \"     Val Ceja, DO  2/3/2025  10:59 AM         [1]   Allergies  Allergen Reactions    Latex ITCHING    Pollen ITCHING

## 2025-02-14 ENCOUNTER — OFFICE VISIT (OUTPATIENT)
Dept: SLEEP CENTER | Age: 30
End: 2025-02-14
Attending: Other
Payer: COMMERCIAL

## 2025-02-14 DIAGNOSIS — F41.9 ANXIETY DISORDER, UNSPECIFIED TYPE: ICD-10-CM

## 2025-02-14 DIAGNOSIS — E66.813 CLASS 3 SEVERE OBESITY DUE TO EXCESS CALORIES WITHOUT SERIOUS COMORBIDITY WITH BODY MASS INDEX (BMI) OF 50.0 TO 59.9 IN ADULT (HCC): ICD-10-CM

## 2025-02-14 DIAGNOSIS — F84.0 AUTISM SPECTRUM DISORDER (HCC): ICD-10-CM

## 2025-02-14 DIAGNOSIS — G47.10 HYPERSOMNIA: ICD-10-CM

## 2025-02-14 DIAGNOSIS — G47.33 OBSTRUCTIVE SLEEP APNEA: Primary | ICD-10-CM

## 2025-02-14 DIAGNOSIS — E66.01 CLASS 3 SEVERE OBESITY DUE TO EXCESS CALORIES WITHOUT SERIOUS COMORBIDITY WITH BODY MASS INDEX (BMI) OF 50.0 TO 59.9 IN ADULT (HCC): ICD-10-CM

## 2025-02-14 PROCEDURE — 95811 POLYSOM 6/>YRS CPAP 4/> PARM: CPT

## 2025-03-16 ENCOUNTER — PATIENT MESSAGE (OUTPATIENT)
Facility: CLINIC | Age: 30
End: 2025-03-16

## 2025-05-09 NOTE — PROGRESS NOTES
This is a 29 year old female who presents with the following symptoms, risk factors, behaviors or other items associated with sleep problems.    Sleep Apnea:   No data recorded  Insomnia:  No data recorded  Restless Leg:  No data recorded  Parasomnias:   No data recorded  Daytime Problems:  No data recorded    The patient's [place] Sleepiness score is (Patient-Rptd) .    TrinyYun  2025 - 2025  : 1995  Age: 29 years  Supply Patient  Compliance Report  Compliance  Payor Standard  Usage 2025 - 2025  Usage days 39/40 days (98%)  >= 4 hours 39 days (98%)  < 4 hours 0 days (0%)  Usage hours 335 hours 47 minutes  Average usage (total days) 8 hours 24 minutes  Average usage (days used) 8 hours 37 minutes  Median usage (days used) 8 hours 18 minutes  Total used hours (value since last reset - 2025) 335 hours  AirSense 11 AutoSet  Serial number [id number]  Mode CPAP  Set pressure 13 cmH2O  EPR Fulltime  EPR level 2  Therapy  Leaks - L/min Median: 1.6 95th percentile: 13.0 Maximum: 84.6  Events per hour AI: 0.7 HI: 0.2 AHI: 0.9  Apnea Index Central: 0.6 Obstructive: 0.1 Unknown: 0.0  RERA Index 0.1  Cheyne-Galvez respiration (average duration per night) 0 minutes (0%)

## 2025-05-12 ENCOUNTER — OFFICE VISIT (OUTPATIENT)
Facility: CLINIC | Age: 30
End: 2025-05-12
Payer: COMMERCIAL

## 2025-05-12 VITALS
BODY MASS INDEX: 50.02 KG/M2 | WEIGHT: 293 LBS | DIASTOLIC BLOOD PRESSURE: 68 MMHG | HEART RATE: 90 BPM | OXYGEN SATURATION: 98 % | HEIGHT: 64 IN | SYSTOLIC BLOOD PRESSURE: 110 MMHG | RESPIRATION RATE: 16 BRPM

## 2025-05-12 DIAGNOSIS — E66.813 CLASS 3 SEVERE OBESITY DUE TO EXCESS CALORIES WITHOUT SERIOUS COMORBIDITY WITH BODY MASS INDEX (BMI) OF 50.0 TO 59.9 IN ADULT: ICD-10-CM

## 2025-05-12 DIAGNOSIS — G47.33 OBSTRUCTIVE SLEEP APNEA: Primary | ICD-10-CM

## 2025-05-12 PROCEDURE — 99214 OFFICE O/P EST MOD 30 MIN: CPT | Performed by: OTHER

## 2025-05-12 NOTE — PATIENT INSTRUCTIONS
Discussed the addition of a mask liner. Options reviewed including REMzzz, Snugz, or permanent mask liners ([email or web address]).  This should help address leak and skin irritation.

## 2025-05-12 NOTE — PROGRESS NOTES
[facility] PULMONARY  SLEEP PROGRESS NOTE        HPI:   This is a 29 year old female coming in for   Chief Complaint   Patient presents with    Obstructive Sleep Apnea (JOSIAH)     Pt here for .  Pt states mask moves during the night and has trouble still falling and staying asleep.  Sleep questionnaire:        HPI:   This is a 29 year old female who presents with the following symptoms, risk factors, behaviors or other items associated with sleep problems.     Sleep Apnea:   No data recorded  Insomnia:  No data recorded  Restless Leg:  No data recorded  Parasomnias:   No data recorded  Daytime Problems:  No data recorded     The patient's [place] Sleepiness score is (Patient-Rptd) .     Yun Agosto  2025 - 2025  : 1995  Age: 29 years  Supply Patient  Compliance Report  Compliance  Payor Standard  Usage 2025 - 2025  Usage days 39/40 days (98%)  >= 4 hours 39 days (98%)  < 4 hours 0 days (0%)  Usage hours 335 hours 47 minutes  Average usage (total days) 8 hours 24 minutes  Average usage (days used) 8 hours 37 minutes  Median usage (days used) 8 hours 18 minutes  Total used hours (value since last reset - 2025) 335 hours  AirSense 11 AutoSet  Serial number [id number]  Mode CPAP  Set pressure 13 cmH2O  EPR Fulltime  EPR level 2  Therapy  Leaks - L/min Median: 1.6 95th percentile: 13.0 Maximum: 84.6  Events per hour AI: 0.7 HI: 0.2 AHI: 0.9  Apnea Index Central: 0.6 Obstructive: 0.1 Unknown: 0.0  RERA Index 0.1  Cheyne-Galvez respiration (average duration per night) 0 minutes (0%)     Patient: Sleep review of systems today: see form.      Pt  PCP:  Susana Chaparro MD  No referring provider defined for this encounter.           No data to display                    Past Medical History[1]  Past Surgical History[2]  Social History:  Social History     Social History Narrative    Not on file     Short Social Hx on File[3]  Family History:  Family  History[4]  Allergies:  Allergies[5]  Current Meds:  Current Medications[6]   Counseling given: Not Answered         Problem List:  Problem List[7]    REVIEW OF SYSTEMS:   Review of Systems    EXAM:   /68   Pulse 90   Resp 16   Ht 5' 4\" (1.626 m)   Wt 297 lb (134.7 kg)   LMP 12/30/2024 (Within Days)   SpO2 98%   BMI 50.98 kg/m²  Estimated body mass index is 50.98 kg/m² as calculated from the following:    Height as of this encounter: 5' 4\" (1.626 m).    Weight as of this encounter: 297 lb (134.7 kg).   Neck in inches:      Wt Readings from Last 6 Encounters:   05/12/25 297 lb (134.7 kg)   02/03/25 294 lb (133.4 kg)   01/07/25 298 lb (135.2 kg)   04/01/24 (!) 304 lb 9.6 oz (138.2 kg)   10/02/23 287 lb (130.2 kg)   09/20/23 250 lb (113.4 kg)     BP Readings from Last 3 Encounters:   05/12/25 110/68   02/03/25 114/70   01/07/25 116/72     Pulse Readings from Last 3 Encounters:   05/12/25 90   02/03/25 84   01/07/25 84     SpO2 Readings from Last 3 Encounters:   05/12/25 98%   02/03/25 96%   04/01/24 98%      Ideal body weight: 54.7 kg (120 lb 9.5 oz)  Adjusted ideal body weight: 86.7 kg (191 lb 2.5 oz)    Vital signs reviewed.  Physical Exam    ASSESSMENT AND PLAN:   1. Obstructive sleep apnea  Using nasal mask  Sleeps on back and side  Feels it is helping  Addressed humidifier issue  EDS has improved  Patient is using and benefiting from regular cpap use.  Patient was instructed to clean equipment on a weekly basis.  Patient was instructed to keep up to date with supplies.  Patient was informed to avoid drowsy driving, or using heavy machinery.  Patient was instructed to followup on a annual basis.     2. Class 3 severe obesity due to excess calories without serious comorbidity with body mass index (BMI) of 50.0 to 59.9 in adult  Discussed possible medication    There are no Patient Instructions on file for this visit.    Independent interpretation of Sleep Download as defined above.  Continue with Rx  management of Sleep apnea with PAP therapy.    COMPLIANCE is required by insurance for 4 hours a night most nights of the week.    Advised if still with sleep apnea and not using CPAP has a 7 fold increase in risk of heart attack, stroke, abnormal heart rhythm  and death,  increased risk of driving accidents.     Advised to refrain from driving when sleepy.      Recommend weight loss, and maintain and optimal BMI with Exercise 30 minutes most days to target heart rate .     Advised patient to change filters,masks,hoses  and tubes and equiptment on a  regular schedule.    Filters and seals shall be changed every 1 month,  Hoses every 3 months,   CPAP mask and humidifier chamber changed every 6 month  with the durable medical equipment provider.         Meds & Refills for this Visit:  Requested Prescriptions      No prescriptions requested or ordered in this encounter       Outcome: Parent verbalizes understanding. Parent is notified to call with any questions, complications, allergies, or worsening or changing symptoms.  Parent is to call with any side effects or complications from the treatments as a result of today.     \" This note was created utilizing Dragon speech recognition software.  Please excuse any grammatical errors. Call my office if you have any questions regarding this note. \"     Val Ceja,   5/12/2025  1:36 PM         [1]   Past Medical History:   Allergic rhinitis    Anxiety    Asthma (HCC)    Depression    Generalized anxiety disorder    Obesity    Sleep apnea   [2]   Past Surgical History:  Procedure Laterality Date    Hc implant ear tubes      Tubal ligation     [3]   Social History  Socioeconomic History    Marital status: Single   Tobacco Use    Smoking status: Never    Smokeless tobacco: Never   Vaping Use    Vaping status: Never Used   Substance and Sexual Activity    Alcohol use: No    Drug use: No    Sexual activity: Never   Other Topics Concern    Caffeine Concern No    Exercise Yes     Seat Belt Yes   [4]   Family History  Problem Relation Age of Onset    Arthritis Father     Arthritis Mother     No Known Problems Maternal Grandmother     Cancer Maternal Grandfather     Vision loss Paternal Grandmother     Stroke Paternal Grandfather     Other (Other) Sister         PCOS   [5]   Allergies  Allergen Reactions    Latex ITCHING    Pollen ITCHING   [6]   Current Outpatient Medications   Medication Sig Dispense Refill    Magnesium 100 MG Oral Cap       busPIRone 10 MG Oral Tab Take 1 tablet (10 mg total) by mouth 2 (two) times daily.      EPINEPHrine (AUVI-Q) 0.3 MG/0.3ML Injection Solution Auto-injector Use as directed for anaphylaxis, then call 911. May dispense brand or generic. 1 duopack. 1 each 1    Desvenlafaxine Succinate  MG Oral Tablet 24 Hr Take 1 tablet (100 mg total) by mouth daily.      Fexofenadine HCl (ALLEGRA OR) Take 180 mg by mouth daily.       [7]   Patient Active Problem List  Diagnosis    Dysthymic disorder    Sprain of calcaneofibular (ligament) of ankle    Anxiety and depression    Severe recurrent major depression without psychotic features (HCC)    Autism spectrum disorder (HCC)    Anxiety disorder    Obsessive compulsive disorder    Obstructive sleep apnea    Class 3 severe obesity due to excess calories without serious comorbidity with body mass index (BMI) of 50.0 to 59.9 in adult    Hypersomnia

## (undated) NOTE — LETTER
Date: 9/21/2023    Patient Name: Kelli Fry          To Whom it may concern: This letter has been written at the patient's request. The above patient was seen at the [facility] for treatment of a medical condition. This patient should be excused from attending school from 09/19/2023 through 09/20/2023. The patient may return to work on 09/21/2023.         Sincerely,          CRIS Gupta

## (undated) NOTE — LETTER
Date: 9/21/2023    Patient Name: Sarahi Aranda          To Whom it may concern: This letter has been written at the patient's request. The above patient was seen at the [facility] for treatment of a medical condition. This patient should be excused from attending work on 09/20/2023. The patient may return to work on 09/21/2023.         Sincerely,          RCIS Christopher

## (undated) NOTE — LETTER
Date: 8/3/2020    Patient Name: Stanford Oropeza          To Whom it may concern:     Patient medically cleared to return to work. Her covid 19 testing was negative. She missed work due to medical illness from 7/30/2020-8/3/2020.  Please call if any que

## (undated) NOTE — MR AVS SNAPSHOT
After Visit Summary   6/9/2023    Merry Marks   MRN: [id number]           Visit Information     Date & Time  6/9/2023  6:00 AM Provider  [facility] Department  [address], Virtual Visit Dept. Phone  [phone]      Your Vitals Were     LMP   08/22/2022             Allergies as of 6/9/2023  Review status set to Review Complete on 9/12/2022       Noted Reaction Type Reactions    Other 11/23/2020    SHORTNESS OF BREATH    Cats and dog    Latex 12/29/2015    ITCHING    Pollen 08/28/2012    ITCHING      Your Current Medications        Dosage    nitrofurantoin monohydrate macro 100 MG Oral Cap Take 1 tablet by mouth two times a day for 7 days    busPIRone 10 MG Oral Tab Take 10 mg by mouth 2 (two) times daily. Fluticasone Propionate 50 MCG/ACT Nasal Suspension 2 sprays by Each Nare route daily. EPINEPHrine (AUVI-Q) 0.3 MG/0.3ML Injection Solution Auto-injector Use as directed for anaphylaxis, then call 911. May dispense brand or generic. 1 duopack. Desvenlafaxine Succinate  MG Oral Tablet 24 Hr Take 100 mg by mouth daily. Cholecalciferol (VITAMIN D) 2000 units Oral Cap Take by mouth. Fexofenadine HCl (ALLEGRA OR) Take 180 mg by mouth daily. Diagnoses for This Visit    UTI symptoms   [213523]  -  Primary           We Ordered the Following     Normal Orders This Visit    OL DIG E/M SVC 5-10 MIN [53581 CPT(R)]       Future Appointments        Provider Department    6/20/2023 4:20 PM [facility], [address] [facility] [facility]                Did you know that [facility] primary care physicians now offer Video Visits through [address] for adult patients for a variety of conditions such as allergies, back pain and cold symptoms? Skip the drive and waiting room and online chat with a doctor face-to-face using your web-cam enabled computer or mobile device wherever you are.  Video Visits cost $50 and can be paid hassle-free using a credit, debit, or health savings card. Not active on [employer]? Ask us how to get signed up today! If you receive a survey from [employer], please take a few minutes to complete it and provide feedback. We strive to deliver the best patient experience and are looking for ways to make improvements. Your feedback will help us do so. For more information on [employer], please visit [email or web address]           No text in SmartText           No text in SmartText

## (undated) NOTE — LETTER
Date: 2/20/2020    Patient Name: Mane Muir          To Whom it may concern: This letter has been written at the patient's request. The above patient was seen at the [facility] for treatment of a medical condition.     This patient

## (undated) NOTE — ED AVS SNAPSHOT
Gerald Ovalles   MRN: [id number]    Department:  [facility] Emergency Department   Date of Visit:  3/28/2019           Disclosure     Insurance plans vary and the physician(s) referred by the ER may not be covered by your plan.  Please contact tell this physician (or your personal doctor if your instructions are to return to your personal doctor) about any new or lasting problems. The primary care or specialist physician will see patients referred from the [facility] Emergency Department.  Neeta Persaud

## (undated) NOTE — LETTER
Date: 10/8/2020    Patient Name: Jozef Bonilla          To Whom it may concern: This letter has been written at the patient's request. The above patient was seen at the [facility] for treatment of a medical condition.     This patient

## (undated) NOTE — LETTER
Date: 10/7/2020    Patient Name: Jose Hines          To Whom it may concern: This letter has been written at the patient's request. The above patient was seen at the [facility] for treatment of a medical condition.     This patient

## (undated) NOTE — LETTER
Date: 8/3/2020    Patient Name: Jad Rios          To Whom it may concern:    Patient medically cleared to return to work. Her covid 19 testing was negative. She missed work due to medical illness for 2 days. Please call if any questions.       Wili Loyd